# Patient Record
Sex: MALE | Race: WHITE | Employment: UNEMPLOYED | URBAN - METROPOLITAN AREA
[De-identification: names, ages, dates, MRNs, and addresses within clinical notes are randomized per-mention and may not be internally consistent; named-entity substitution may affect disease eponyms.]

---

## 2019-12-07 ENCOUNTER — APPOINTMENT (OUTPATIENT)
Dept: GENERAL RADIOLOGY | Age: 29
DRG: 720 | End: 2019-12-07
Attending: NURSE PRACTITIONER
Payer: MEDICAID

## 2019-12-07 ENCOUNTER — HOSPITAL ENCOUNTER (INPATIENT)
Age: 29
LOS: 5 days | Discharge: HOME | DRG: 720 | End: 2019-12-12
Attending: EMERGENCY MEDICINE | Admitting: INTERNAL MEDICINE
Payer: MEDICAID

## 2019-12-07 ENCOUNTER — APPOINTMENT (OUTPATIENT)
Dept: CT IMAGING | Age: 29
DRG: 720 | End: 2019-12-07
Attending: NURSE PRACTITIONER
Payer: MEDICAID

## 2019-12-07 DIAGNOSIS — E87.20 METABOLIC ACIDOSIS: ICD-10-CM

## 2019-12-07 DIAGNOSIS — E86.0 DEHYDRATION: Primary | ICD-10-CM

## 2019-12-07 DIAGNOSIS — R10.13 ABDOMINAL PAIN, EPIGASTRIC: ICD-10-CM

## 2019-12-07 DIAGNOSIS — E87.20 LACTIC ACIDOSIS: ICD-10-CM

## 2019-12-07 PROBLEM — A41.9 SEPSIS (HCC): Status: ACTIVE | Noted: 2019-12-07

## 2019-12-07 LAB
ALBUMIN SERPL-MCNC: 5.7 G/DL (ref 3.5–5)
ALBUMIN/GLOB SERPL: 1.3 {RATIO} (ref 1.1–2.2)
ALP SERPL-CCNC: 77 U/L (ref 45–117)
ALT SERPL-CCNC: 23 U/L (ref 12–78)
ANION GAP SERPL CALC-SCNC: 22 MMOL/L (ref 5–15)
APPEARANCE UR: ABNORMAL
AST SERPL-CCNC: 22 U/L (ref 15–37)
ATRIAL RATE: 91 BPM
BACTERIA URNS QL MICRO: ABNORMAL /HPF
BASOPHILS # BLD: 0 K/UL (ref 0–0.1)
BASOPHILS NFR BLD: 0 % (ref 0–1)
BILIRUB SERPL-MCNC: 2.3 MG/DL (ref 0.2–1)
BILIRUB UR QL CFM: NEGATIVE
BUN SERPL-MCNC: 89 MG/DL (ref 6–20)
BUN/CREAT SERPL: 19 (ref 12–20)
CALCIUM SERPL-MCNC: 9.8 MG/DL (ref 8.5–10.1)
CALCULATED P AXIS, ECG09: 74 DEGREES
CALCULATED R AXIS, ECG10: 87 DEGREES
CALCULATED T AXIS, ECG11: 67 DEGREES
CHLORIDE SERPL-SCNC: 91 MMOL/L (ref 97–108)
CO2 SERPL-SCNC: 16 MMOL/L (ref 21–32)
COLOR UR: ABNORMAL
COMMENT, HOLDF: NORMAL
COMMENT, HOLDF: NORMAL
CREAT SERPL-MCNC: 4.6 MG/DL (ref 0.7–1.3)
DIAGNOSIS, 93000: NORMAL
DIFFERENTIAL METHOD BLD: ABNORMAL
EOSINOPHIL # BLD: 0 K/UL (ref 0–0.4)
EOSINOPHIL NFR BLD: 0 % (ref 0–7)
EPITH CASTS URNS QL MICRO: ABNORMAL /LPF
ERYTHROCYTE [DISTWIDTH] IN BLOOD BY AUTOMATED COUNT: 12.9 % (ref 11.5–14.5)
FLUAV AG NPH QL IA: NEGATIVE
FLUBV AG NOSE QL IA: NEGATIVE
GLOBULIN SER CALC-MCNC: 4.4 G/DL (ref 2–4)
GLUCOSE SERPL-MCNC: 161 MG/DL (ref 65–100)
GLUCOSE UR STRIP.AUTO-MCNC: NEGATIVE MG/DL
HCT VFR BLD AUTO: 55.4 % (ref 36.6–50.3)
HEMOCCULT STL QL: NEGATIVE
HGB BLD-MCNC: 19.7 G/DL (ref 12.1–17)
HGB UR QL STRIP: ABNORMAL
IMM GRANULOCYTES # BLD AUTO: 0.1 K/UL (ref 0–0.04)
IMM GRANULOCYTES NFR BLD AUTO: 1 % (ref 0–0.5)
KETONES UR QL STRIP.AUTO: NEGATIVE MG/DL
LACTATE SERPL-SCNC: 1.8 MMOL/L (ref 0.4–2)
LACTATE SERPL-SCNC: 4.2 MMOL/L (ref 0.4–2)
LEUKOCYTE ESTERASE UR QL STRIP.AUTO: NEGATIVE
LIPASE SERPL-CCNC: 52 U/L (ref 73–393)
LYMPHOCYTES # BLD: 1.5 K/UL (ref 0.8–3.5)
LYMPHOCYTES NFR BLD: 10 % (ref 12–49)
MCH RBC QN AUTO: 29.4 PG (ref 26–34)
MCHC RBC AUTO-ENTMCNC: 35.6 G/DL (ref 30–36.5)
MCV RBC AUTO: 82.7 FL (ref 80–99)
MONOCYTES # BLD: 0.9 K/UL (ref 0–1)
MONOCYTES NFR BLD: 6 % (ref 5–13)
MUCOUS THREADS URNS QL MICRO: ABNORMAL /LPF
NEUTS SEG # BLD: 12.8 K/UL (ref 1.8–8)
NEUTS SEG NFR BLD: 83 % (ref 32–75)
NITRITE UR QL STRIP.AUTO: NEGATIVE
NRBC # BLD: 0 K/UL (ref 0–0.01)
NRBC BLD-RTO: 0 PER 100 WBC
P-R INTERVAL, ECG05: 118 MS
PH UR STRIP: 5 [PH] (ref 5–8)
PLATELET # BLD AUTO: 332 K/UL (ref 150–400)
PMV BLD AUTO: 11.5 FL (ref 8.9–12.9)
POTASSIUM SERPL-SCNC: 3.5 MMOL/L (ref 3.5–5.1)
PROT SERPL-MCNC: 10.1 G/DL (ref 6.4–8.2)
PROT UR STRIP-MCNC: 100 MG/DL
Q-T INTERVAL, ECG07: 384 MS
QRS DURATION, ECG06: 86 MS
QTC CALCULATION (BEZET), ECG08: 472 MS
RBC # BLD AUTO: 6.7 M/UL (ref 4.1–5.7)
RBC #/AREA URNS HPF: ABNORMAL /HPF (ref 0–5)
SAMPLES BEING HELD,HOLD: NORMAL
SAMPLES BEING HELD,HOLD: NORMAL
SODIUM SERPL-SCNC: 129 MMOL/L (ref 136–145)
SP GR UR REFRACTOMETRY: 1.02 (ref 1–1.03)
UR CULT HOLD, URHOLD: NORMAL
UROBILINOGEN UR QL STRIP.AUTO: 0.2 EU/DL (ref 0.2–1)
VENTRICULAR RATE, ECG03: 91 BPM
WBC # BLD AUTO: 15.3 K/UL (ref 4.1–11.1)
WBC URNS QL MICRO: ABNORMAL /HPF (ref 0–4)

## 2019-12-07 PROCEDURE — C9113 INJ PANTOPRAZOLE SODIUM, VIA: HCPCS | Performed by: NURSE PRACTITIONER

## 2019-12-07 PROCEDURE — 71046 X-RAY EXAM CHEST 2 VIEWS: CPT

## 2019-12-07 PROCEDURE — 93005 ELECTROCARDIOGRAM TRACING: CPT

## 2019-12-07 PROCEDURE — 82272 OCCULT BLD FECES 1-3 TESTS: CPT

## 2019-12-07 PROCEDURE — 74011000250 HC RX REV CODE- 250: Performed by: NURSE PRACTITIONER

## 2019-12-07 PROCEDURE — 74176 CT ABD & PELVIS W/O CONTRAST: CPT

## 2019-12-07 PROCEDURE — 87040 BLOOD CULTURE FOR BACTERIA: CPT

## 2019-12-07 PROCEDURE — 87086 URINE CULTURE/COLONY COUNT: CPT

## 2019-12-07 PROCEDURE — 81001 URINALYSIS AUTO W/SCOPE: CPT

## 2019-12-07 PROCEDURE — 85025 COMPLETE CBC W/AUTO DIFF WBC: CPT

## 2019-12-07 PROCEDURE — 74011000258 HC RX REV CODE- 258: Performed by: INTERNAL MEDICINE

## 2019-12-07 PROCEDURE — 36415 COLL VENOUS BLD VENIPUNCTURE: CPT

## 2019-12-07 PROCEDURE — 96375 TX/PRO/DX INJ NEW DRUG ADDON: CPT

## 2019-12-07 PROCEDURE — 51798 US URINE CAPACITY MEASURE: CPT

## 2019-12-07 PROCEDURE — 99285 EMERGENCY DEPT VISIT HI MDM: CPT

## 2019-12-07 PROCEDURE — 74011250636 HC RX REV CODE- 250/636: Performed by: INTERNAL MEDICINE

## 2019-12-07 PROCEDURE — 87804 INFLUENZA ASSAY W/OPTIC: CPT

## 2019-12-07 PROCEDURE — 74011250637 HC RX REV CODE- 250/637: Performed by: INTERNAL MEDICINE

## 2019-12-07 PROCEDURE — 74011250636 HC RX REV CODE- 250/636: Performed by: NURSE PRACTITIONER

## 2019-12-07 PROCEDURE — 83605 ASSAY OF LACTIC ACID: CPT

## 2019-12-07 PROCEDURE — 74011000250 HC RX REV CODE- 250: Performed by: INTERNAL MEDICINE

## 2019-12-07 PROCEDURE — 80053 COMPREHEN METABOLIC PANEL: CPT

## 2019-12-07 PROCEDURE — 65660000001 HC RM ICU INTERMED STEPDOWN

## 2019-12-07 PROCEDURE — 83690 ASSAY OF LIPASE: CPT

## 2019-12-07 PROCEDURE — 96361 HYDRATE IV INFUSION ADD-ON: CPT

## 2019-12-07 PROCEDURE — 96374 THER/PROPH/DIAG INJ IV PUSH: CPT

## 2019-12-07 RX ORDER — SODIUM CHLORIDE 0.9 % (FLUSH) 0.9 %
5-40 SYRINGE (ML) INJECTION AS NEEDED
Status: DISCONTINUED | OUTPATIENT
Start: 2019-12-07 | End: 2019-12-12 | Stop reason: HOSPADM

## 2019-12-07 RX ORDER — SODIUM CHLORIDE 9 MG/ML
125 INJECTION, SOLUTION INTRAVENOUS CONTINUOUS
Status: DISCONTINUED | OUTPATIENT
Start: 2019-12-07 | End: 2019-12-11

## 2019-12-07 RX ORDER — ONDANSETRON 2 MG/ML
4 INJECTION INTRAMUSCULAR; INTRAVENOUS
Status: DISCONTINUED | OUTPATIENT
Start: 2019-12-07 | End: 2019-12-07

## 2019-12-07 RX ORDER — ONDANSETRON 2 MG/ML
4 INJECTION INTRAMUSCULAR; INTRAVENOUS
Status: DISCONTINUED | OUTPATIENT
Start: 2019-12-07 | End: 2019-12-12 | Stop reason: HOSPADM

## 2019-12-07 RX ORDER — MORPHINE SULFATE 4 MG/ML
4 INJECTION INTRAVENOUS ONCE
Status: COMPLETED | OUTPATIENT
Start: 2019-12-07 | End: 2019-12-07

## 2019-12-07 RX ORDER — MORPHINE SULFATE 2 MG/ML
1 INJECTION, SOLUTION INTRAMUSCULAR; INTRAVENOUS
Status: DISCONTINUED | OUTPATIENT
Start: 2019-12-07 | End: 2019-12-07

## 2019-12-07 RX ORDER — SODIUM CHLORIDE 0.9 % (FLUSH) 0.9 %
5-40 SYRINGE (ML) INJECTION EVERY 8 HOURS
Status: DISCONTINUED | OUTPATIENT
Start: 2019-12-07 | End: 2019-12-12 | Stop reason: HOSPADM

## 2019-12-07 RX ORDER — HALOPERIDOL 5 MG/ML
2 INJECTION INTRAMUSCULAR ONCE
Status: COMPLETED | OUTPATIENT
Start: 2019-12-07 | End: 2019-12-07

## 2019-12-07 RX ORDER — MORPHINE SULFATE 2 MG/ML
1 INJECTION, SOLUTION INTRAMUSCULAR; INTRAVENOUS
Status: DISCONTINUED | OUTPATIENT
Start: 2019-12-07 | End: 2019-12-12 | Stop reason: HOSPADM

## 2019-12-07 RX ORDER — ACETAMINOPHEN 325 MG/1
650 TABLET ORAL
Status: DISCONTINUED | OUTPATIENT
Start: 2019-12-07 | End: 2019-12-12 | Stop reason: HOSPADM

## 2019-12-07 RX ORDER — SODIUM CHLORIDE 0.9 % (FLUSH) 0.9 %
10 SYRINGE (ML) INJECTION
Status: DISCONTINUED | OUTPATIENT
Start: 2019-12-07 | End: 2019-12-07

## 2019-12-07 RX ORDER — HEPARIN SODIUM 5000 [USP'U]/ML
5000 INJECTION, SOLUTION INTRAVENOUS; SUBCUTANEOUS EVERY 12 HOURS
Status: DISCONTINUED | OUTPATIENT
Start: 2019-12-07 | End: 2019-12-12 | Stop reason: HOSPADM

## 2019-12-07 RX ORDER — ONDANSETRON 2 MG/ML
4 INJECTION INTRAMUSCULAR; INTRAVENOUS
Status: COMPLETED | OUTPATIENT
Start: 2019-12-07 | End: 2019-12-07

## 2019-12-07 RX ADMIN — SODIUM CHLORIDE 75 ML/HR: 900 INJECTION, SOLUTION INTRAVENOUS at 13:46

## 2019-12-07 RX ADMIN — SODIUM CHLORIDE 1000 ML: 900 INJECTION, SOLUTION INTRAVENOUS at 10:42

## 2019-12-07 RX ADMIN — CEFEPIME HYDROCHLORIDE 1 G: 1 INJECTION, POWDER, FOR SOLUTION INTRAMUSCULAR; INTRAVENOUS at 13:49

## 2019-12-07 RX ADMIN — Medication 10 ML: at 21:05

## 2019-12-07 RX ADMIN — MORPHINE SULFATE 1 MG: 2 INJECTION, SOLUTION INTRAMUSCULAR; INTRAVENOUS at 23:29

## 2019-12-07 RX ADMIN — SODIUM CHLORIDE 10 MG: 9 INJECTION INTRAMUSCULAR; INTRAVENOUS; SUBCUTANEOUS at 23:22

## 2019-12-07 RX ADMIN — SODIUM CHLORIDE, SODIUM LACTATE, POTASSIUM CHLORIDE, AND CALCIUM CHLORIDE 1000 ML: 600; 310; 30; 20 INJECTION, SOLUTION INTRAVENOUS at 12:18

## 2019-12-07 RX ADMIN — Medication 10 ML: at 14:38

## 2019-12-07 RX ADMIN — ONDANSETRON 4 MG: 2 INJECTION INTRAMUSCULAR; INTRAVENOUS at 21:04

## 2019-12-07 RX ADMIN — SODIUM CHLORIDE 10 MG: 9 INJECTION INTRAMUSCULAR; INTRAVENOUS; SUBCUTANEOUS at 18:25

## 2019-12-07 RX ADMIN — MORPHINE SULFATE 1 MG: 2 INJECTION, SOLUTION INTRAMUSCULAR; INTRAVENOUS at 18:52

## 2019-12-07 RX ADMIN — ONDANSETRON 4 MG: 2 INJECTION INTRAMUSCULAR; INTRAVENOUS at 11:57

## 2019-12-07 RX ADMIN — SODIUM CHLORIDE 1000 ML: 900 INJECTION, SOLUTION INTRAVENOUS at 11:19

## 2019-12-07 RX ADMIN — HEPARIN SODIUM 5000 UNITS: 5000 INJECTION INTRAVENOUS; SUBCUTANEOUS at 14:31

## 2019-12-07 RX ADMIN — SODIUM CHLORIDE 125 ML/HR: 900 INJECTION, SOLUTION INTRAVENOUS at 21:07

## 2019-12-07 RX ADMIN — ONDANSETRON 4 MG: 2 INJECTION INTRAMUSCULAR; INTRAVENOUS at 14:39

## 2019-12-07 RX ADMIN — ACETAMINOPHEN 650 MG: 325 TABLET ORAL at 17:12

## 2019-12-07 RX ADMIN — HALOPERIDOL LACTATE 2 MG: 5 INJECTION INTRAMUSCULAR at 10:42

## 2019-12-07 RX ADMIN — SODIUM CHLORIDE 40 MG: 9 INJECTION INTRAMUSCULAR; INTRAVENOUS; SUBCUTANEOUS at 10:42

## 2019-12-07 RX ADMIN — MORPHINE SULFATE 4 MG: 4 INJECTION INTRAVENOUS at 11:57

## 2019-12-07 RX ADMIN — MORPHINE SULFATE 1 MG: 2 INJECTION, SOLUTION INTRAMUSCULAR; INTRAVENOUS at 14:39

## 2019-12-07 NOTE — ED PROVIDER NOTES
Patient is a 67-year-old male with history of anxiety, depression, septicemia who presents with complaints that started 8 days ago. Patient endorses epigastric and right upper quadrant abdominal pain with nausea and vomiting. Endorses hematemesis and dark stools. Also states his urine is getting darker. Has been unable to keep any fluids down for several days. Endorses dry cough, as of breath no other acute complaints chills. Denies fevers. Endorses body aches. Hot showers sometimes help with his symptoms. He has been trying heat packs on his abdomen. No other interventions. No other acute complaints. Past Medical History:   Diagnosis Date    ADD (attention deficit disorder with hyperactivity)     Anxiety     Depression     Fracture, toe     little left    Septicemia ([facility] [address]        No past surgical history on file.       Family History:   Problem Relation Age of Onset    Psychiatric Disorder Mother         Bipolar    Cancer Father         Lymphoma    Heart Disease Maternal Grandfather     Hypertension Maternal Grandfather     Heart Disease Paternal Grandfather     Hypertension Paternal Grandfather        Social History     Socioeconomic History    Marital status: SINGLE     Spouse name: Not on file    Number of children: Not on file    Years of education: Not on file    Highest education level: Not on file   Occupational History    Not on file   Social Needs    Financial resource strain: Not on file    Food insecurity:     Worry: Not on file     Inability: Not on file    Transportation needs:     Medical: Not on file     Non-medical: Not on file   Tobacco Use    Smoking status: Current Some Day Smoker     Packs/day: 0.25   Substance and Sexual Activity    Alcohol use: No    Drug use: No    Sexual activity: Not on file   Lifestyle    Physical activity:     Days per week: Not on file     Minutes per session: Not on file    Stress: Not on file   Relationships    Social connections:     Talks on phone: Not on file     Gets together: Not on file     Attends [employer] service: Not on file     Active member of club or organization: Not on file     Attends meetings of clubs or organizations: Not on file     Relationship status: Not on file    Intimate partner violence:     Fear of current or ex partner: Not on file     Emotionally abused: Not on file     Physically abused: Not on file     Forced sexual activity: Not on file   Other Topics Concern    Not on file   Social History Narrative    Not on file         ALLERGIES: Patient has no known allergies. Review of Systems   Constitutional: Positive for chills and diaphoresis. Negative for fever. HENT: Positive for congestion, postnasal drip, rhinorrhea and sore throat. Respiratory: Positive for cough (dry cough) and shortness of breath. Gastrointestinal: Positive for abdominal pain, nausea and vomiting. Genitourinary: Positive for decreased urine volume and difficulty urinating. Negative for testicular pain. Musculoskeletal: Positive for myalgias. Neurological: Negative for dizziness and headaches. All other systems reviewed and are negative. There were no vitals filed for this visit. Physical Exam  Vitals signs and nursing note reviewed. Constitutional:       Appearance: Normal appearance. HENT:      Mouth/Throat:      Comments: Mild posterior pharynx erythema with mild swelling. No tonsillar swelling or exudate. Eyes:      Pupils: Pupils are equal, round, and reactive to light. Neck:      Musculoskeletal: Normal range of motion and neck supple. Cardiovascular:      Rate and Rhythm: Regular rhythm. Tachycardia present. Pulmonary:      Effort: Pulmonary effort is normal.      Breath sounds: Normal breath sounds. Abdominal:      General: Abdomen is flat. Palpations: Abdomen is soft. Tenderness: There is tenderness (Epigastric, right upper quadrant). There is guarding. Musculoskeletal: Normal range of motion. Skin:     General: Skin is warm and dry. Neurological:      General: No focal deficit present. Mental Status: He is alert and oriented to person, place, and time. Psychiatric:         Mood and Affect: Mood normal.         Behavior: Behavior normal.          MDM     10:21 AM  Patient presents with multiple complaints but most persistent are epigastric and right upper quadrant abdominal pain with nausea vomiting. On exam he is tachycardic. Afebrile. And pale. Concern for PUD. Versus cholecystitis versus viral.  Will check influenza, abdominal labs, and check for fecal occult blood. Will give IV fluids for dehydration as well as pantoprazole and Haldol for vomiting. 11:36 AM  Lactic acid 4.2. Checking on chemistry that has not resulted. There was an error and has to be rerun. Will continue IVFs and recheck lactate. CT abdomen/pelvis ordered. 11:49 AM  Code sepsis called. Pt continues to have pain in upper abdomen. Chemistry still pending. Will add morphine for pain control. CT scan still pending creatinine results. 12:10 PM  CMP results noted. Pt with metabolic acidosis with an anion gap. Will continue IVFs. CT scan without contrast due to abdominal pain. Will admit to hospitalist.      Hospitalist Coy Serve for Admission  12:09 PM    ED Room Number: ER19/19  Patient Name and age:  Tom Mosqueda 30 y.o.  male  Working Diagnosis: dehydration, JM, lactic acidosis  Readmission:No  Isolation Requirements:  No  Recommended Level of Care:  Step down  Code Status:  {CODE STATUS:Full  Department:ED  Other:      12:52 PM  HR improved. Down to 90s. 3rd liter IVFs ordered. Lactic acid and rpt BMP to check progression.    Procedures

## 2019-12-07 NOTE — PROGRESS NOTES
Admission Medication Reconciliation:    Information obtained from:  Patient; chartreview  RxQuery data available¹:  YES    Comments: Updated PTA meds/reviewed patient's allergies. 1)  Spoke with patient who is a good historian. He is currently not taking any medications; patient DOES have old RX for Clonopin that he may take during anxiety attack. Denies any recent intake of PeptoBismol, multivitamins, iron supplement; Patient alcohol and THC intake is limited (~twice month). 2)  Medication changes (since last review)  Removed  - Zantac    3) updated Allergies (added Buspar)  Patient reported having \"anaphylactic\" shock  eight years ago; shortly after starting [facility] benefit data reflects medications filled and processed through the patient's insurance, however   this data does NOT capture whether the medication was picked up or is currently being taken by the patient. Allergies:  Buspar [buspirone]    Significant PMH/Disease States:   Past Medical History:   Diagnosis Date    ADD (attention deficit disorder with hyperactivity)     Anxiety     Depression     Fracture, toe     little left    Septicemia [facility])      Chief Complaint for this Admission:    Chief Complaint   Patient presents with    Urinary Hesitancy    Vomiting    Chills     Prior to Admission Medications:   None       Please contact the main inpatient pharmacy with any questions or concerns at [phone] and we will direct you to the clinical pharmacist covering this patient's care while in-house.    Munira Alves, KESHAV

## 2019-12-07 NOTE — H&P
History & Physical    Primary Care Provider: Eric Casey MD  Source of Information: Patient     History of Presenting Illness:   Mr Herbie Nielson is 70-year-old male with no significant medical history presents to the hospital with chief complaint of not feeling well of 1 week duration. Patient stated that for the last 1 week he was not feeling well in which he described as having both right and left lower lower abdominal pain, flan pain and pain on his epigastric area associated with nausea and recurrent vomiting. Patient also stated that he was very diaphoretic having pain during urination with dark color urine. He was having additional urinary symptoms with needing to strain before urination and poor intermittent flow. He was also having some difficulty breathing and wheezing . He denies fever, chest pain, palpitation or cough. Patient was last admitted in the hospital in 2010 for sepsis due to community-acquired pneumonia. Patient stated that he has multiple admission for septicemia even though record shows that he has been admitted once in this hospital.       Review of Systems:  Review of Systems   Constitutional: Negative for activity change, Per HPI   HENT: Negative for ear pain, facial swelling, sore throat and trouble swallowing.    Eyes: No visual disturbance. Negative for pain and discharge. Respiratory: Per HPI   Cardiovascular: Negative for chest pain and palpitations. Gastrointestinal: Per HPI   Genitourinary: Per HPI   Musculoskeletal: Negative for arthralgias, joint swelling, myalgias and neck pain. Skin: Negative for color change and rash. Neurological: Negative for  dizziness, headache, weakness or numbness. Hematological: Negative for adenopathy. Does not bruise/bleed easily. Psychiatric/Behavioral: Negative for behavioral problems, confusion and sleep disturbance.   All other systems reviewed and are negative.         Past Medical History:   Diagnosis Date    ADD (attention deficit disorder with hyperactivity)     Anxiety     Depression     Fracture, toe     little left    Septicemia ([facility] [address]       History reviewed. No pertinent surgical history. Prior to Admission medications    Not on File     Allergies   Allergen Reactions    Buspar [Buspirone] Unknown (comments)     Patient reported having \"anaphylactic\" shock in ~2012      Family History   Problem Relation Age of Onset    Psychiatric Disorder Mother         Bipolar    Cancer Father         Lymphoma    Heart Disease Maternal Grandfather     Hypertension Maternal Grandfather     Heart Disease Paternal Grandfather     Hypertension Paternal Grandfather         SOCIAL HISTORY:  Patient resides:  Independently x   Assisted Living    SNF    With family care       Smoking history:   None    Former    Chronic x     Alcohol history:   None    Social x   Chronic      Ambulates:   Independently x   w/cane    w/walker    w/wc    CODE STATUS:  DNR    Full x   Other      Objective:     Physical Exam:     Visit Vitals  /89   Pulse 91   Temp 98.8 °F (37.1 °C)   Resp 15   SpO2 97%      O2 Device: Room air    General:  Alert, cooperative, no distress, appears stated age. Head:  Normocephalic, without obvious abnormality, atraumatic. Eyes:  Conjunctivae/corneas clear. PERRL, EOMs intact. Nose: Nares normal. Septum midline. Mucosa normal. No drainage or sinus tenderness. Throat: Lips, mucosa, and tongue normal. Teeth and gums normal.   Neck: Supple, symmetrical, trachea midline, no adenopathy, thyroid: no enlargement/tenderness/nodules, no carotid bruit and no JVD. Back:   Symmetric, no curvature. ROM normal. No CVA tenderness. Lungs:   Clear to auscultation bilaterally. Chest wall:  No tenderness or deformity. Heart:  Regular rate and rhythm, S1, S2 normal, no murmur, click, rub or gallop.    Abdomen:   Abdominal tenderness at the epigastric area, RUQ, R and LLQ  : CVA tenderness    Extremities: Extremities normal, atraumatic, no cyanosis or edema. Pulses: 2+ and symmetric all extremities. Skin: Skin color, texture, turgor normal. No rashes or lesions   Neurologic: CNII-XII intact. Data Review:     Recent Days:  Recent Labs     12/07/19  1029   WBC 15.3*   HGB 19.7*   HCT 55.4*        Recent Labs     12/07/19  1029   *   K 3.5   CL 91*   CO2 16*   *   BUN 89*   CREA 4.60*   CA 9.8   ALB 5.7*   TBILI 2.3*   SGOT 22   ALT 23     No results for input(s): PH, PCO2, PO2, HCO3, FIO2 in the last 72 hours. 24 Hour Results:  Recent Results (from the past 24 hour(s))   CBC WITH AUTOMATED DIFF    Collection Time: 12/07/19 10:29 AM   Result Value Ref Range    WBC 15.3 (H) 4.1 - 11.1 K/uL    RBC 6.70 (H) 4.10 - 5.70 M/uL    HGB 19.7 (H) 12.1 - 17.0 g/dL    HCT 55.4 (H) 36.6 - 50.3 %    MCV 82.7 80.0 - 99.0 FL    MCH 29.4 26.0 - 34.0 PG    MCHC 35.6 30.0 - 36.5 g/dL    RDW 12.9 11.5 - 14.5 %    PLATELET 767 875 - 256 K/uL    MPV 11.5 8.9 - 12.9 FL    NRBC 0.0 0  WBC    ABSOLUTE NRBC 0.00 0.00 - 0.01 K/uL    NEUTROPHILS 83 (H) 32 - 75 %    LYMPHOCYTES 10 (L) 12 - 49 %    MONOCYTES 6 5 - 13 %    EOSINOPHILS 0 0 - 7 %    BASOPHILS 0 0 - 1 %    IMMATURE GRANULOCYTES 1 (H) 0.0 - 0.5 %    ABS. NEUTROPHILS 12.8 (H) 1.8 - 8.0 K/UL    ABS. LYMPHOCYTES 1.5 0.8 - 3.5 K/UL    ABS. MONOCYTES 0.9 0.0 - 1.0 K/UL    ABS. EOSINOPHILS 0.0 0.0 - 0.4 K/UL    ABS. BASOPHILS 0.0 0.0 - 0.1 K/UL    ABS. IMM.  GRANS. 0.1 (H) 0.00 - 0.04 K/UL    DF AUTOMATED     METABOLIC PANEL, COMPREHENSIVE    Collection Time: 12/07/19 10:29 AM   Result Value Ref Range    Sodium 129 (L) 136 - 145 mmol/L    Potassium 3.5 3.5 - 5.1 mmol/L    Chloride 91 (L) 97 - 108 mmol/L    CO2 16 (L) 21 - 32 mmol/L    Anion gap 22 (H) 5 - 15 mmol/L    Glucose 161 (H) 65 - 100 mg/dL    BUN 89 (H) 6 - 20 MG/DL    Creatinine 4.60 (H) 0.70 - 1.30 MG/DL    BUN/Creatinine ratio 19 12 - 20      GFR est AA 19 (L) >60 ml/min/1.73m2    GFR est non-AA 15 (L) >60 ml/min/1.73m2    Calcium 9.8 8.5 - 10.1 MG/DL    Bilirubin, total 2.3 (H) 0.2 - 1.0 MG/DL    ALT (SGPT) 23 12 - 78 U/L    AST (SGOT) 22 15 - 37 U/L    Alk. phosphatase 77 45 - 117 U/L    Protein, total 10.1 (H) 6.4 - 8.2 g/dL    Albumin 5.7 (H) 3.5 - 5.0 g/dL    Globulin 4.4 (H) 2.0 - 4.0 g/dL    A-G Ratio 1.3 1.1 - 2.2     LIPASE    Collection Time: 12/07/19 10:29 AM   Result Value Ref Range    Lipase 52 (L) 73 - 393 U/L   INFLUENZA A & B AG (RAPID TEST)    Collection Time: 12/07/19 10:29 AM   Result Value Ref Range    Influenza A Antigen NEGATIVE  NEG      Influenza B Antigen NEGATIVE  NEG     LACTIC ACID    Collection Time: 12/07/19 10:29 AM   Result Value Ref Range    Lactic acid 4.2 (HH) 0.4 - 2.0 MMOL/L   SAMPLES BEING HELD    Collection Time: 12/07/19 10:29 AM   Result Value Ref Range    SAMPLES BEING HELD 1RED 1BLU     COMMENT        Add-on orders for these samples will be processed based on acceptable specimen integrity and analyte stability, which may vary by analyte. OCCULT BLOOD, STOOL    Collection Time: 12/07/19 10:56 AM   Result Value Ref Range    Occult blood, stool NEGATIVE  NEG     URINALYSIS W/ RFLX MICROSCOPIC    Collection Time: 12/07/19 11:21 AM   Result Value Ref Range    Color DARK YELLOW      Appearance CLOUDY (A) CLEAR      Specific gravity 1.021 1.003 - 1.030      pH (UA) 5.0 5.0 - 8.0      Protein 100 (A) NEG mg/dL    Glucose NEGATIVE  NEG mg/dL    Ketone NEGATIVE  NEG mg/dL    Blood SMALL (A) NEG      Urobilinogen 0.2 0.2 - 1.0 EU/dL    Nitrites NEGATIVE  NEG      Leukocyte Esterase NEGATIVE  NEG      WBC 5-10 0 - 4 /hpf    RBC 5-10 0 - 5 /hpf    Epithelial cells FEW FEW /lpf    Bacteria 1+ (A) NEG /hpf    Mucus 1+ (A) NEG /lpf   URINE CULTURE HOLD SAMPLE    Collection Time: 12/07/19 11:21 AM   Result Value Ref Range    Urine culture hold        URINE ON HOLD IN MICROBIOLOGY DEPT FOR 3 DAYS.  IF UNPRESERVED URINE IS SUBMITTED, IT CANNOT BE USED FOR ADDITIONAL TESTING AFTER 24 HRS, RECOLLECTION WILL BE REQUIRED. BILIRUBIN, CONFIRM    Collection Time: 12/07/19 11:21 AM   Result Value Ref Range    Bilirubin UA, confirm NEGATIVE  NEG     EKG, 12 LEAD, INITIAL    Collection Time: 12/07/19 12:29 PM   Result Value Ref Range    Ventricular Rate 91 BPM    Atrial Rate 91 BPM    P-R Interval 118 ms    QRS Duration 86 ms    Q-T Interval 384 ms    QTC Calculation (Bezet) 472 ms    Calculated P Axis 74 degrees    Calculated R Axis 87 degrees    Calculated T Axis 67 degrees    Diagnosis       Normal sinus rhythm with sinus arrhythmia  When compared with ECG of 29-JUN-2012 03:47,  No significant change was found     SAMPLES BEING HELD    Collection Time: 12/07/19  1:32 PM   Result Value Ref Range    SAMPLES BEING HELD LACIONICE     COMMENT        Add-on orders for these samples will be processed based on acceptable specimen integrity and analyte stability, which may vary by analyte. Imaging:     Assessment:     # Sepsis due to pyelonephritis  # Anion gap metabolic acidosis  # JM  # Hyponatremia       Plan:     # Sepsis due to pyelonephritis  Supported by tachycardia, high white cell count and elevated lactic acid, +UA associated nausea and vomiting   - UA suggestive for infection, urine culture pending- follow result   - Blood culture collected in ER, follow result   - CT abdomen no hydronephrosis   - Start on cefepime  - supportive measures: start on IVF, Zofran and pain medication   - trend troponin     # Anion gap metabolic acidosis  likely from lactic acidosis   - ct with IVF     # JM  - start on IVF   - monitor BMP daily   - avoid nephrotoxins and renally dose others     # Sob/wheeze: chest x ray unremarkable. Nebs PRN   # RUQ/Epigastric pain: trial of Pepcid.  If no symptom improvement will consider gallbladder US     # Hyponatremia: ct with IVF     Code: Full   DVT prop: Heparin     Dispo: Home   Functional status: Fully independent    Sepsis assessment done and completed.          Signed By: Berenice Smith MD     December 7, 2019

## 2019-12-07 NOTE — ED NOTES
ED MD EKG interpretation: Normal sinus rhythm at 91 beats a minute. Axis is normal, intervals are normal, no ectopy or acute ischemic changes noted.

## 2019-12-07 NOTE — ED TRIAGE NOTES
Arrives ambulatory for c/o dark urine, difficulty voiding, flank pain, vomiting \"dark stuff\", shortness of breath, difficulty sleeping, and chills. \"Cold weather makes the pain worse. \"  Pt has reddened area to abdomen from taking multiple hot showers     Hx sepsis

## 2019-12-07 NOTE — ROUTINE PROCESS
TRANSFER - OUT REPORT: 
 
Verbal report given to MARCELL Lassiter(name) on Abhijeet Berry  being transferred to [facility](unit) for routine progression of care Report consisted of patients Situation, Background, Assessment and  
Recommendations(SBAR). Information from the following report(s) SBAR was reviewed with the receiving nurse. Lines:  
Peripheral IV 12/07/19 Left Forearm (Active) Site Assessment Clean 12/7/2019 10:35 AM  
Phlebitis Assessment 0 12/7/2019 10:35 AM  
Infiltration Assessment 0 12/7/2019 10:35 AM  
Dressing Status Clean, dry, & intact 12/7/2019 10:35 AM  
Dressing Type Transparent 12/7/2019 10:35 AM  
Hub Color/Line Status Pink 12/7/2019 10:35 AM  
Action Taken Blood drawn 12/7/2019 10:35 AM  
   
Peripheral IV 12/07/19 Right Antecubital (Active) Opportunity for questions and clarification was provided. Patient transported with: 
 [place]

## 2019-12-07 NOTE — ED NOTES
Attempted to call report to Lee brody RN. Unable to take report at this time. Will call after hanging medication.

## 2019-12-07 NOTE — PROGRESS NOTES
Problem: Sepsis: Day of Diagnosis  Goal: *Fluid resuscitation  Outcome: Progressing Towards Goal  2L/ Normal saline, 1L/ Lactated Ringers  Goal: *Paired blood cultures prior to first dose of antibiotic  Outcome: Progressing Towards Goal    Blood cultures pending   Goal: *Lactic acid with first set of blood cultures  Outcome: Progressing Towards Goal  Lactic acid 1.8 now  Goal: Diagnostic Test/Procedures  Outcome: Progressing Towards Goal  Chest x ray, abdominal ultrasound   Goal: Medications  Outcome: Progressing Towards Goal  Zosyn, vancomycin   Goal: Treatments/Interventions/Procedures  Outcome: Progressing Towards Goal  Strict intake and output, vital signs q4h, daily labs     2003-Bedside shift change report given to [address] [place] [address]  (oncoming nurse) by Yuly Israel RN  (offgoing nurse). Report included the following information SBAR, Intake/Output, MAR, Accordion and Recent Results.

## 2019-12-07 NOTE — PROGRESS NOTES
Day #1 of cefepime  Indication:  UTI  Current regimen:  1 gram Q12h  Abx regimen: cefepime  Recent Labs     19  1029   WBC 15.3*   CREA 4.60*   BUN 89*     Est CrCl: 20-30 ml/min  Temp (24hrs), Av.3 °F (36.8 °C), Min:97.7 °F (36.5 °C), Max:98.8 °F (37.1 °C)    Cultures:    pending    Plan: Change to cefepime 1 gram Q24H for severe UTI and crcl 20-30 ml/min

## 2019-12-08 LAB
ANION GAP SERPL CALC-SCNC: 4 MMOL/L (ref 5–15)
BACTERIA SPEC CULT: NORMAL
BUN SERPL-MCNC: 49 MG/DL (ref 6–20)
BUN/CREAT SERPL: 29 (ref 12–20)
CALCIUM SERPL-MCNC: 7.6 MG/DL (ref 8.5–10.1)
CC UR VC: NORMAL
CHLORIDE SERPL-SCNC: 107 MMOL/L (ref 97–108)
CO2 SERPL-SCNC: 26 MMOL/L (ref 21–32)
CREAT SERPL-MCNC: 1.69 MG/DL (ref 0.7–1.3)
ERYTHROCYTE [DISTWIDTH] IN BLOOD BY AUTOMATED COUNT: 12.3 % (ref 11.5–14.5)
GLUCOSE SERPL-MCNC: 117 MG/DL (ref 65–100)
HCT VFR BLD AUTO: 36.6 % (ref 36.6–50.3)
HGB BLD-MCNC: 12.5 G/DL (ref 12.1–17)
MAGNESIUM SERPL-MCNC: 2.7 MG/DL (ref 1.6–2.4)
MCH RBC QN AUTO: 29.5 PG (ref 26–34)
MCHC RBC AUTO-ENTMCNC: 34.2 G/DL (ref 30–36.5)
MCV RBC AUTO: 86.3 FL (ref 80–99)
NRBC # BLD: 0 K/UL (ref 0–0.01)
NRBC BLD-RTO: 0 PER 100 WBC
PLATELET # BLD AUTO: 199 K/UL (ref 150–400)
PMV BLD AUTO: 11.3 FL (ref 8.9–12.9)
POTASSIUM SERPL-SCNC: 3.4 MMOL/L (ref 3.5–5.1)
RBC # BLD AUTO: 4.24 M/UL (ref 4.1–5.7)
SERVICE CMNT-IMP: NORMAL
SODIUM SERPL-SCNC: 137 MMOL/L (ref 136–145)
WBC # BLD AUTO: 10.4 K/UL (ref 4.1–11.1)

## 2019-12-08 PROCEDURE — 83735 ASSAY OF MAGNESIUM: CPT

## 2019-12-08 PROCEDURE — 85027 COMPLETE CBC AUTOMATED: CPT

## 2019-12-08 PROCEDURE — 74011250637 HC RX REV CODE- 250/637: Performed by: INTERNAL MEDICINE

## 2019-12-08 PROCEDURE — 65270000029 HC RM PRIVATE

## 2019-12-08 PROCEDURE — 36415 COLL VENOUS BLD VENIPUNCTURE: CPT

## 2019-12-08 PROCEDURE — 74011000250 HC RX REV CODE- 250: Performed by: INTERNAL MEDICINE

## 2019-12-08 PROCEDURE — 74011250636 HC RX REV CODE- 250/636: Performed by: INTERNAL MEDICINE

## 2019-12-08 PROCEDURE — 80048 BASIC METABOLIC PNL TOTAL CA: CPT

## 2019-12-08 PROCEDURE — 74011000258 HC RX REV CODE- 258: Performed by: INTERNAL MEDICINE

## 2019-12-08 RX ORDER — IBUPROFEN 200 MG
1 TABLET ORAL DAILY
Status: DISCONTINUED | OUTPATIENT
Start: 2019-12-08 | End: 2019-12-12 | Stop reason: HOSPADM

## 2019-12-08 RX ORDER — POTASSIUM CHLORIDE 750 MG/1
40 TABLET, FILM COATED, EXTENDED RELEASE ORAL
Status: COMPLETED | OUTPATIENT
Start: 2019-12-08 | End: 2019-12-08

## 2019-12-08 RX ORDER — FAMOTIDINE 20 MG/1
20 TABLET, FILM COATED ORAL 2 TIMES DAILY
Status: DISCONTINUED | OUTPATIENT
Start: 2019-12-08 | End: 2019-12-12 | Stop reason: HOSPADM

## 2019-12-08 RX ADMIN — Medication 10 ML: at 14:00

## 2019-12-08 RX ADMIN — SODIUM CHLORIDE 125 ML/HR: 900 INJECTION, SOLUTION INTRAVENOUS at 05:25

## 2019-12-08 RX ADMIN — MORPHINE SULFATE 1 MG: 2 INJECTION, SOLUTION INTRAMUSCULAR; INTRAVENOUS at 03:10

## 2019-12-08 RX ADMIN — MORPHINE SULFATE 1 MG: 2 INJECTION, SOLUTION INTRAMUSCULAR; INTRAVENOUS at 11:37

## 2019-12-08 RX ADMIN — SODIUM CHLORIDE 10 MG: 9 INJECTION INTRAMUSCULAR; INTRAVENOUS; SUBCUTANEOUS at 19:37

## 2019-12-08 RX ADMIN — SODIUM CHLORIDE 10 MG: 9 INJECTION INTRAMUSCULAR; INTRAVENOUS; SUBCUTANEOUS at 05:38

## 2019-12-08 RX ADMIN — MORPHINE SULFATE 1 MG: 2 INJECTION, SOLUTION INTRAMUSCULAR; INTRAVENOUS at 20:37

## 2019-12-08 RX ADMIN — SODIUM CHLORIDE 125 ML/HR: 900 INJECTION, SOLUTION INTRAVENOUS at 13:12

## 2019-12-08 RX ADMIN — CEFEPIME HYDROCHLORIDE 1 G: 1 INJECTION, POWDER, FOR SOLUTION INTRAMUSCULAR; INTRAVENOUS at 14:08

## 2019-12-08 RX ADMIN — HEPARIN SODIUM 5000 UNITS: 5000 INJECTION INTRAVENOUS; SUBCUTANEOUS at 14:09

## 2019-12-08 RX ADMIN — MORPHINE SULFATE 1 MG: 2 INJECTION, SOLUTION INTRAMUSCULAR; INTRAVENOUS at 16:32

## 2019-12-08 RX ADMIN — FAMOTIDINE 20 MG: 20 TABLET ORAL at 20:37

## 2019-12-08 RX ADMIN — ONDANSETRON 4 MG: 2 INJECTION INTRAMUSCULAR; INTRAVENOUS at 09:39

## 2019-12-08 RX ADMIN — SODIUM CHLORIDE 10 MG: 9 INJECTION INTRAMUSCULAR; INTRAVENOUS; SUBCUTANEOUS at 11:36

## 2019-12-08 RX ADMIN — ONDANSETRON 4 MG: 2 INJECTION INTRAMUSCULAR; INTRAVENOUS at 03:10

## 2019-12-08 RX ADMIN — MORPHINE SULFATE 1 MG: 2 INJECTION, SOLUTION INTRAMUSCULAR; INTRAVENOUS at 07:32

## 2019-12-08 RX ADMIN — Medication 10 ML: at 05:39

## 2019-12-08 RX ADMIN — ONDANSETRON 4 MG: 2 INJECTION INTRAMUSCULAR; INTRAVENOUS at 16:32

## 2019-12-08 RX ADMIN — Medication 10 ML: at 05:38

## 2019-12-08 RX ADMIN — POTASSIUM CHLORIDE 40 MEQ: 750 TABLET, FILM COATED, EXTENDED RELEASE ORAL at 09:39

## 2019-12-08 RX ADMIN — ACETAMINOPHEN 650 MG: 325 TABLET ORAL at 19:38

## 2019-12-08 NOTE — PROGRESS NOTES
Problem: Sepsis: Day of Diagnosis  Goal: *Fluid resuscitation  Outcome: Progressing Towards Goal  Note:   Patient on IV fluids,iv abx,pain and nausea control for pyelonephritis,monitoring labs and vitals. pt progressing towards goal.  Goal: *Paired blood cultures prior to first dose of antibiotic  Outcome: Progressing Towards Goal  Goal: Activity/Safety  Outcome: Progressing Towards Goal  Goal: Nutrition/Diet  Outcome: Progressing Towards Goal     Problem: Falls - Risk of  Goal: *Absence of Falls  Description  Document Mela Cruz Fall Risk and appropriate interventions in the flowsheet.   Outcome: Progressing Towards Goal  Note: Fall Risk Interventions:            Medication Interventions: Evaluate medications/consider consulting pharmacy, Patient to call before getting OOB, Teach patient to arise slowly    Elimination Interventions: Call light in reach, Patient to call for help with toileting needs, Stay With Me (per policy), Urinal in reach, Toileting schedule/hourly rounds, Toilet paper/wipes in reach

## 2019-12-08 NOTE — PROGRESS NOTES
Hospitalist Progress Note  Ghada Beltre MD  Answering service: [phone] from in house phone      Date of Service:  2019  NAME:  Jason Alston  :  1990  MRN:  [id number]      Admission Summary:   Mr Savi Buchanan is 67-year-old male with no significant medical history presents to the hospital with chief complaint of not feeling well of 1 week duration. Patient stated that for the last 1 week he was not feeling well in which he described as having both right and left lower lower abdominal pain, flan pain and pain on his epigastric area associated with nausea and recurrent vomiting. Patient also stated that he was very diaphoretic having pain during urination with dark color urine. He was having additional urinary symptoms with needing to strain before urination and poor intermittent flow. He was also having some difficulty breathing and wheezing . He denies fever, chest pain, palpitation or cough. Patient was last admitted in the hospital in  for sepsis due to community-acquired pneumonia. Patient stated that he has multiple admission for septicemia even though record shows that he has been admitted once in this hospital.    Interval history / Subjective:      Patient seen and examined this afternoon. He is still c/o right upper quadrant abdominal pain. Nausea, vomiting, better. Labs better     Assessment & Plan:     # Sepsis no clear source   due to suspected pyelonephritis vs cholecystitis   Supported by tachycardia, high white cell count and elevated lactic acid, +UA,  associated nausea and vomiting   - UA with bacteria, urine culture no growth   - blood culture gram +ve cocci 1/4 bottle and no growth in others.   - CT abdomen no hydronephrosis, no appendicitis   - RUQ abdominal US  Small amount of gallbladder sludge, with wall thickening up to 4.3 mm and mild pericholecystic fluid.  9 mm gallbladder polyp or adherent stone  - ct with cefepime 12/7--present   - supportive measures: on IVF, Zofran and pain medication   - lactic acidosis resolved   - surgery consult- plan for HIDA scan      # Anion gap metabolic acidosis: resolved   likely from lactic acidosis   - s/p IVF      # JM: Resolved   - ct with IVF   - monitor BMP daily   - avoid nephrotoxins and renally dose others      # Sob/wheeze: chest x ray unremarkable. Nebs PRN   # RUQ/Epigastric pain: trial of Pepcid.  US as above. Plan as above   # Hyponatremia: resolved after IVF      Code: Full   DVT prop: Heparin      Dispo: Home      Hospital Problems  Date Reviewed: 1/31/2014          Codes Class Noted POA    Sepsis ([facility] [address] ICD-10-CM: A41.9  ICD-9-CM: 038.9, 995.91  12/7/2019 Unknown                Review of Systems:   Pertinent positive mentioned in interval hx/HPI. Other systems reviewed and negative     Vital Signs:    Last 24hrs VS reviewed since prior progress note. Most recent are:  Visit Vitals  /74 (BP 1 Location: Left arm, BP Patient Position: At rest)   Pulse 72   Temp 98.3 °F (36.8 °C)   Resp 12   Wt 59 kg (130 lb)   SpO2 98%   BMI 19.77 kg/m²         Intake/Output Summary (Last 24 hours) at 12/8/2019 1715  Last data filed at 12/8/2019 1409  Gross per 24 hour   Intake 2667.5 ml   Output 1545 ml   Net 1122.5 ml        Physical Examination:             Constitutional:  No acute distress, cooperative, pleasant    ENT:  Oral mucous moist, oropharynx benign. Neck supple,    Resp:  CTA bilaterally. No wheezing/rhonchi/rales. No accessory muscle use   CV:  Regular rhythm, normal rate, no murmurs, gallops, rubs    GI:  Soft, non distended, mild tenderness on RUQ. normoactive bowel sounds, no hepatosplenomegaly     Musculoskeletal:  No edema, warm, 2+ pulses throughout    Neurologic:  Moves all extremities.   AAOx3, CN II-XII reviewed           Data Review:   I personally reviewed labs and imaging     Labs:     Recent Labs     12/08/19  0316 12/07/19  1029 WBC 10.4 15.3*   HGB 12.5 19.7*   HCT 36.6 55.4*    332     Recent Labs     12/08/19  0345 12/08/19  0316 12/07/19  1029   NA  --  137 129*   K  --  3.4* 3.5   CL  --  107 91*   CO2  --  26 16*   BUN  --  49* 89*   CREA  --  1.69* 4.60*   GLU  --  117* 161*   CA  --  7.6* 9.8   MG 2.7*  --   --      Recent Labs     12/07/19  1029   SGOT 22   ALT 23   AP 77   TBILI 2.3*   TP 10.1*   ALB 5.7*   GLOB 4.4*   LPSE 52*     No results for input(s): INR, PTP, APTT, INREXT in the last 72 hours. No results for input(s): FE, TIBC, PSAT, FERR in the last 72 hours. No results found for: FOL, RBCF   No results for input(s): PH, PCO2, PO2 in the last 72 hours. No results for input(s): CPK, CKNDX, TROIQ in the last 72 hours.     No lab exists for component: CPKMB  No results found for: CHOL, CHOLX, CHLST, CHOLV, HDL, HDLP, LDL, LDLC, DLDLP, TGLX, TRIGL, TRIGP, CHHD, CHHDX  Lab Results   Component Value Date/Time    Glucose (POC) 98 10/13/2010 08:00 AM    Glucose (POC) 105 10/12/2010 10:05 PM    Glucose (POC) 151 (H) 10/12/2010 11:51 AM    Glucose (POC) 195 (H) 10/12/2010 07:24 AM     Lab Results   Component Value Date/Time    Color DARK YELLOW 12/07/2019 11:21 AM    Appearance CLOUDY (A) 12/07/2019 11:21 AM    Specific gravity 1.021 12/07/2019 11:21 AM    pH (UA) 5.0 12/07/2019 11:21 AM    Protein 100 (A) 12/07/2019 11:21 AM    Glucose NEGATIVE  12/07/2019 11:21 AM    Ketone NEGATIVE  12/07/2019 11:21 AM    Bilirubin Negative 01/31/2014 11:15 AM    Urobilinogen 0.2 12/07/2019 11:21 AM    Nitrites NEGATIVE  12/07/2019 11:21 AM    Leukocyte Esterase NEGATIVE  12/07/2019 11:21 AM    Epithelial cells FEW 12/07/2019 11:21 AM    Bacteria 1+ (A) 12/07/2019 11:21 AM    WBC 5-10 12/07/2019 11:21 AM    RBC 5-10 12/07/2019 11:21 AM         Medications Reviewed:     Current Facility-Administered Medications   Medication Dose Route Frequency    nicotine (NICODERM CQ) 14 mg/24 hr patch 1 Patch  1 Patch TransDERmal DAILY    sodium chloride (NS) flush 5-40 mL  5-40 mL IntraVENous Q8H    sodium chloride (NS) flush 5-40 mL  5-40 mL IntraVENous PRN    acetaminophen (TYLENOL) tablet 650 mg  650 mg Oral Q4H PRN    cefepime (MAXIPIME) 1 g in 0.9% sodium chloride (MBP/ADV) 50 mL  1 g IntraVENous Q24H    0.9% sodium chloride infusion  125 mL/hr IntraVENous CONTINUOUS    sodium chloride (NS) flush 5-40 mL  5-40 mL IntraVENous Q8H    sodium chloride (NS) flush 5-40 mL  5-40 mL IntraVENous PRN    heparin (porcine) injection 5,000 Units  5,000 Units SubCUTAneous Q12H    ondansetron (ZOFRAN) injection 4 mg  4 mg IntraVENous Q4H PRN    influenza vaccine 2019-20 (6 mos+)(PF) (FLUARIX/FLULAVAL/FLUZONE QUAD) injection 0.5 mL  0.5 mL IntraMUSCular PRIOR TO DISCHARGE    prochlorperazine (COMPAZINE) with saline injection 10 mg  10 mg IntraVENous Q6H PRN    morphine injection 1 mg  1 mg IntraVENous Q4H PRN     ______________________________________________________________________  EXPECTED LENGTH OF STAY: - - -  ACTUAL LENGTH OF STAY:          1                 Janki Robison MD   Patient has given Verbal permission to discuss medical care with   persons present in the room and and also with contact as listed on face sheet.

## 2019-12-08 NOTE — PROGRESS NOTES
Problem: Sepsis: Day of Diagnosis  Goal: *Fluid resuscitation  Outcome: Progressing Towards Goal  Goal: Medications  Outcome: Progressing Towards Goal  Goal: Respiratory  Outcome: Progressing Towards Goal     Problem: Falls - Risk of  Goal: *Absence of Falls  Description  Document Willard Laverne Fall Risk and appropriate interventions in the flowsheet. Outcome: Progressing Towards Goal  Note: Fall Risk Interventions:  Medication Interventions: Patient to call before getting OOB, Teach patient to arise slowly    Elimination Interventions: Call light in reach, Patient to call for help with toileting needs, Stay With Me (per policy)     Last 3 Recorded Weights in this Encounter    12/07/19 1730 12/08/19 0314   Weight: 58.3 kg (128 lb 8 oz) 59 kg (130 lb)   Pt. Weight bed. Bedside shift change report given to Ulysses Quiroz RN (oncoming nurse) by Howie Drummond RN (offgoing nurse). Report included the following information SBAR, Kardex, ED Summary, Procedure Summary, Intake/Output, MAR, Accordion, Recent Results, Med Rec Status, Cardiac Rhythm NSR, Sinus Tachycardia, Alarm Parameters , Pre Procedure Checklist, Procedure Verification and Quality Measures.

## 2019-12-09 ENCOUNTER — APPOINTMENT (OUTPATIENT)
Dept: ULTRASOUND IMAGING | Age: 29
DRG: 720 | End: 2019-12-09
Attending: INTERNAL MEDICINE
Payer: MEDICAID

## 2019-12-09 LAB
ALBUMIN SERPL-MCNC: 3.3 G/DL (ref 3.5–5)
ALBUMIN/GLOB SERPL: 1.7 {RATIO} (ref 1.1–2.2)
ALP SERPL-CCNC: 46 U/L (ref 45–117)
ALT SERPL-CCNC: 17 U/L (ref 12–78)
ANION GAP SERPL CALC-SCNC: 4 MMOL/L (ref 5–15)
AST SERPL-CCNC: 21 U/L (ref 15–37)
BILIRUB DIRECT SERPL-MCNC: 0.4 MG/DL (ref 0–0.2)
BILIRUB SERPL-MCNC: 2.5 MG/DL (ref 0.2–1)
BUN SERPL-MCNC: 26 MG/DL (ref 6–20)
BUN/CREAT SERPL: 26 (ref 12–20)
CALCIUM SERPL-MCNC: 7.8 MG/DL (ref 8.5–10.1)
CHLORIDE SERPL-SCNC: 108 MMOL/L (ref 97–108)
CO2 SERPL-SCNC: 26 MMOL/L (ref 21–32)
CREAT SERPL-MCNC: 0.99 MG/DL (ref 0.7–1.3)
ERYTHROCYTE [DISTWIDTH] IN BLOOD BY AUTOMATED COUNT: 12.2 % (ref 11.5–14.5)
GLOBULIN SER CALC-MCNC: 1.9 G/DL (ref 2–4)
GLUCOSE SERPL-MCNC: 99 MG/DL (ref 65–100)
HAV IGM SER QL: NONREACTIVE
HBV CORE IGM SER QL: NONREACTIVE
HBV SURFACE AG SER QL: 0.21 INDEX
HBV SURFACE AG SER QL: NEGATIVE
HCT VFR BLD AUTO: 32.7 % (ref 36.6–50.3)
HCV AB SERPL QL IA: NONREACTIVE
HCV COMMENT,HCGAC: NORMAL
HGB BLD-MCNC: 11.1 G/DL (ref 12.1–17)
MCH RBC QN AUTO: 29.8 PG (ref 26–34)
MCHC RBC AUTO-ENTMCNC: 33.9 G/DL (ref 30–36.5)
MCV RBC AUTO: 87.9 FL (ref 80–99)
NRBC # BLD: 0 K/UL (ref 0–0.01)
NRBC BLD-RTO: 0 PER 100 WBC
PLATELET # BLD AUTO: 162 K/UL (ref 150–400)
PMV BLD AUTO: 11.1 FL (ref 8.9–12.9)
POTASSIUM SERPL-SCNC: 3.5 MMOL/L (ref 3.5–5.1)
PROT SERPL-MCNC: 5.2 G/DL (ref 6.4–8.2)
RBC # BLD AUTO: 3.72 M/UL (ref 4.1–5.7)
SODIUM SERPL-SCNC: 138 MMOL/L (ref 136–145)
SP1: NORMAL
SP2: NORMAL
SP3: NORMAL
WBC # BLD AUTO: 7.8 K/UL (ref 4.1–11.1)

## 2019-12-09 PROCEDURE — 74011000258 HC RX REV CODE- 258: Performed by: INTERNAL MEDICINE

## 2019-12-09 PROCEDURE — 80048 BASIC METABOLIC PNL TOTAL CA: CPT

## 2019-12-09 PROCEDURE — 65270000032 HC RM SEMIPRIVATE

## 2019-12-09 PROCEDURE — 87040 BLOOD CULTURE FOR BACTERIA: CPT

## 2019-12-09 PROCEDURE — 74011000250 HC RX REV CODE- 250: Performed by: INTERNAL MEDICINE

## 2019-12-09 PROCEDURE — 80076 HEPATIC FUNCTION PANEL: CPT

## 2019-12-09 PROCEDURE — 74011250636 HC RX REV CODE- 250/636: Performed by: INTERNAL MEDICINE

## 2019-12-09 PROCEDURE — 74011250637 HC RX REV CODE- 250/637: Performed by: INTERNAL MEDICINE

## 2019-12-09 PROCEDURE — 80074 ACUTE HEPATITIS PANEL: CPT

## 2019-12-09 PROCEDURE — 76700 US EXAM ABDOM COMPLETE: CPT

## 2019-12-09 PROCEDURE — 36415 COLL VENOUS BLD VENIPUNCTURE: CPT

## 2019-12-09 PROCEDURE — 85027 COMPLETE CBC AUTOMATED: CPT

## 2019-12-09 RX ORDER — HYDROXYZINE 25 MG/1
50 TABLET, FILM COATED ORAL ONCE
Status: COMPLETED | OUTPATIENT
Start: 2019-12-10 | End: 2019-12-10

## 2019-12-09 RX ORDER — DICYCLOMINE HYDROCHLORIDE 20 MG/1
20 TABLET ORAL ONCE
Status: COMPLETED | OUTPATIENT
Start: 2019-12-09 | End: 2019-12-09

## 2019-12-09 RX ADMIN — SODIUM CHLORIDE 10 MG: 9 INJECTION INTRAMUSCULAR; INTRAVENOUS; SUBCUTANEOUS at 16:05

## 2019-12-09 RX ADMIN — MORPHINE SULFATE 1 MG: 2 INJECTION, SOLUTION INTRAMUSCULAR; INTRAVENOUS at 13:25

## 2019-12-09 RX ADMIN — SODIUM CHLORIDE 10 MG: 9 INJECTION INTRAMUSCULAR; INTRAVENOUS; SUBCUTANEOUS at 03:10

## 2019-12-09 RX ADMIN — ONDANSETRON 4 MG: 2 INJECTION INTRAMUSCULAR; INTRAVENOUS at 13:29

## 2019-12-09 RX ADMIN — HEPARIN SODIUM 5000 UNITS: 5000 INJECTION INTRAVENOUS; SUBCUTANEOUS at 13:34

## 2019-12-09 RX ADMIN — MORPHINE SULFATE 1 MG: 2 INJECTION, SOLUTION INTRAMUSCULAR; INTRAVENOUS at 09:33

## 2019-12-09 RX ADMIN — CEFEPIME HYDROCHLORIDE 1 G: 1 INJECTION, POWDER, FOR SOLUTION INTRAMUSCULAR; INTRAVENOUS at 13:36

## 2019-12-09 RX ADMIN — ONDANSETRON 4 MG: 2 INJECTION INTRAMUSCULAR; INTRAVENOUS at 07:05

## 2019-12-09 RX ADMIN — SODIUM CHLORIDE 125 ML/HR: 900 INJECTION, SOLUTION INTRAVENOUS at 17:14

## 2019-12-09 RX ADMIN — MORPHINE SULFATE 1 MG: 2 INJECTION, SOLUTION INTRAMUSCULAR; INTRAVENOUS at 02:06

## 2019-12-09 RX ADMIN — Medication 10 ML: at 13:30

## 2019-12-09 RX ADMIN — SODIUM CHLORIDE 10 MG: 9 INJECTION INTRAMUSCULAR; INTRAVENOUS; SUBCUTANEOUS at 09:23

## 2019-12-09 RX ADMIN — FAMOTIDINE 20 MG: 20 TABLET ORAL at 08:24

## 2019-12-09 RX ADMIN — FAMOTIDINE 20 MG: 20 TABLET ORAL at 17:22

## 2019-12-09 RX ADMIN — HEPARIN SODIUM 5000 UNITS: 5000 INJECTION INTRAVENOUS; SUBCUTANEOUS at 02:06

## 2019-12-09 RX ADMIN — DICYCLOMINE HYDROCHLORIDE 20 MG: 20 TABLET ORAL at 05:39

## 2019-12-09 RX ADMIN — MORPHINE SULFATE 1 MG: 2 INJECTION, SOLUTION INTRAMUSCULAR; INTRAVENOUS at 05:39

## 2019-12-09 NOTE — PROGRESS NOTES
TRANSFER - IN REPORT:    [facility] Lizeth Lim  being received from  (unit) for routine progression of care      Report consisted of patients Situation, Background, Assessment and   Recommendations(SBAR). Information from the following report(s) SBAR, MAR and Recent Results was reviewed with the receiving nurse. Opportunity for questions and clarification was provided. Assessment will be completed upon patients arrival to unit and care assumed.

## 2019-12-09 NOTE — PROGRESS NOTES
TRANSFER - OUT REPORT:    Verbal report given to Kelly Maldonado RN(name) on Naa English  being transferred to (unit) for routine progression of care       Report consisted of patients Situation, Background, Assessment and   Recommendations(SBAR). Information from the following report(s) SBAR, Kardex, Intake/Output, MAR and Accordion was reviewed with the receiving nurse. Lines:   Peripheral IV 12/07/19 Left Forearm (Active)   Site Assessment Clean, dry, & intact 12/9/2019  8:34 AM   Phlebitis Assessment 0 12/9/2019  8:34 AM   Infiltration Assessment 0 12/9/2019  8:34 AM   Dressing Status Clean, dry, & intact 12/9/2019  8:34 AM   Dressing Type Transparent 12/9/2019  8:34 AM   Hub Color/Line Status Infusing 12/9/2019  8:34 AM   Action Taken Open ports on tubing capped 12/9/2019  8:34 AM   Alcohol Cap Used Yes 12/9/2019  8:34 AM        Opportunity for questions and clarification was provided.       Patient transported with:   Registered Nurse

## 2019-12-09 NOTE — PROGRESS NOTES
RN spoke with nuclear medicine. Pt needs to be NPO at midnight and receive no morphine or any morphine derivative after 0400 on 12/10/19.

## 2019-12-09 NOTE — PROGRESS NOTES
TRANSFER - IN REPORT:    Verbal report received from MARCELL Rodrigues(name) on Princess Hendrix  being received from [facility]) for routine progression of care      Report consisted of patients Situation, Background, Assessment and   Recommendations(SBAR). Information from the following report(s) SBAR, Kardex, ED Summary, Intake/Output and MAR was reviewed with the receiving nurse. Opportunity for questions and clarification was provided. Assessment completed upon patients arrival to unit and care assumed.

## 2019-12-09 NOTE — PROGRESS NOTES
Bedside and Verbal shift change report given to Sharyn Osgood, RN (oncoming nurse) by Lavell Moore RN (offgoing nurse). Report included the following information SBAR and Kardex.

## 2019-12-09 NOTE — PHYSICIAN ADVISORY
Letter of Status Determination: Current Status INPATIENT is Appropriate Pt Name:  Jenny Morillo MR#  [id number] [facility]#   [id number] Room [facility] Hospital  552/01  @ [facility]  
Hospitalization date  12/7/2019 10:03 AM  
Current Attending Physician  Irving Robison MD  
Principal diagnosis  Sepsis due to Pyelonephritis Acute kidney injury Hyponatremia. Clinicals  31 y/o gentleman with a PMH significant for Septicemia, Attention Deficit Hyperactivity disorder, Depression and Anxiety Disorder was brought to the hospital for complaints of an approximately one week history of not feeling well with bilateral lower abdominal and flank pain, nausea and vomiting. Patient's initial evaluation was consistent with sepsis due to probable Pyelonephritis with positive urinalysis, leukocytosis, and lactic acidemia. Patient was cultured and started on broad coverage IV antibiotics along with IVFs. Patient also noted with a severe Hyponatremia present on admission. Patient is requiring IV opiate therapy and IV anti-emetics for optimal control of symptoms. [place] MCG criteria Does  apply STATUS DETERMINATION  On the basis of clinical data, available documentaion, we believe that the current status of this patient as INPATIENT is Appropriate. This patient is at high risk of adverse events and deterioration based on documented clinical data, comorbid conditions and current acute care course. The final decision of the patient's hospitalization status depends on the Attending Physician's judgement. Additional comments Insurance  Payor: SELF PAY / Plan: [employer] SELF PAY / Product Type: Self Pay / Insurance Information SELF PAY/BS[employer] SELF PAY Phone: [phone] Subscriber: Prince Reilly Subscriber#: [id number]  Group#:  Precert#:   
  
 
 
The information in this document is a recommendation to be used for utilization review and utilization management purposes only. This recommendation is not an order. The recommendation is made based on the information reviewed at the time of the referral, is pursuant to the [facility] Conditions of Participation (42 CFR Part 482), and is neither a judgment nor an assessment with regard to the appropriateness or quality of clinical care. For all Managed Care patients: The Criteria are intended solely for use as screening guidelines with respect to the medical appropriateness of healthcare services and not for final clinical or payment determinations concerning the type or level of medical care provided, or proposed to be provided, to a patient. They help the reviewers determine whether a patient is appropriate for observation or inpatient admission at the time a decision to admit the patient is being made. All efforts are made to apply the pertinent payor criteria (MCG or [employer]) as well as the clinical judgements based on the information reviewed at the time of the referral.\" Nothing in this document may be used to limit, alter, or affect clinical services provided to the patient named. Elly Solorzano MD [facility] Phys[facility] Advisor [place] [phone] [email or web address] 
 
8:25 AM 12/9/2019

## 2019-12-09 NOTE — PROGRESS NOTES
Primary Nurse Alexandrea Haro RN and Gilda Sidhu RN performed a dual skin assessment on this patient No impairment noted  Orlin score is 21.

## 2019-12-09 NOTE — PROGRESS NOTES
Bedside and Verbal shift change report given to Saranya Espinoza RN (oncoming nurse) by Vanesa Walker RN (offgoing nurse). Report included the following information SBAR, Kardex and MAR.

## 2019-12-09 NOTE — CONSULTS
[facility] [address] Consultation      Admit Date: 12/7/2019  Reason for Consultation: sepsis of unknown source. ? gallbladder    HPI:  Prince Vasques is a 30 y.o. male whom we are asked to see in consultation for sepsis of unknown source. He presented to the ED on 12/7/19 with one week history of vomiting dark fluid and bile, left lower quadrant that moved to his RUQ over the last 2 days, nausea with minimal PO intake over the last week, and constipation. He was admitted with sepsis, JM, and lactic acidosis. Has been treated with cefepime for the last 2 days. At present his leukocytosis and abdominal pain have resolved. Blood culture prelim result = GPC in 1/2 bottles drawn. Abd US:  GALLBLADDER: Contains sludge as well as a 9 mm polyp. Small amount pericholecystic fluid with wall thickening up to 4.3 mm. COMMON BILE DUCT: 3 mm in diameter. No significant dilatation. IMPRESSION:   Small amount of gallbladder sludge, with wall thickening up to 4.3 mm and mild  pericholecystic fluid. 9 mm gallbladder polyp or adherent stone. No biliary dilatation. CT A/P 12/7/19: no acute findings. No surgical history. He was recently treated for sepsis secondary to pneumonia. Has been taking clear liquids today. Patient Active Problem List    Diagnosis Date Noted    Sepsis ([address] 12/07/2019    Abdominal pain 03/30/2012    Diarrhea 03/30/2012    Nausea 03/30/2012     Past Medical History:   Diagnosis Date    ADD (attention deficit disorder with hyperactivity)     Anxiety     Depression     Fracture, toe     little left    Septicemia ([address]       History reviewed. No pertinent surgical history.    Social History     Tobacco Use    Smoking status: Current Some Day Smoker     Packs/day: 0.25    Smokeless tobacco: Never Used   Substance Use Topics    Alcohol use: No      Family History   Problem Relation Age of Onset    Psychiatric Disorder Mother         Bipolar    Cancer Father         Lymphoma    Heart Disease Maternal Grandfather     Hypertension Maternal Grandfather     Heart Disease Paternal Grandfather     Hypertension Paternal Grandfather       Prior to Admission medications    Not on File     Current Facility-Administered Medications   Medication Dose Route Frequency    nicotine (NICODERM CQ) 14 mg/24 hr patch 1 Patch  1 Patch TransDERmal DAILY    famotidine (PEPCID) tablet 20 mg  20 mg Oral BID    sodium chloride (NS) flush 5-40 mL  5-40 mL IntraVENous Q8H    sodium chloride (NS) flush 5-40 mL  5-40 mL IntraVENous PRN    acetaminophen (TYLENOL) tablet 650 mg  650 mg Oral Q4H PRN    cefepime (MAXIPIME) 1 g in 0.9% sodium chloride (MBP/ADV) 50 mL  1 g IntraVENous Q24H    0.9% sodium chloride infusion  125 mL/hr IntraVENous CONTINUOUS    sodium chloride (NS) flush 5-40 mL  5-40 mL IntraVENous Q8H    sodium chloride (NS) flush 5-40 mL  5-40 mL IntraVENous PRN    heparin (porcine) injection 5,000 Units  5,000 Units SubCUTAneous Q12H    ondansetron (ZOFRAN) injection 4 mg  4 mg IntraVENous Q4H PRN    influenza vaccine - (6 mos+)(PF) (FLUARIX/FLULAVAL/FLUZONE QUAD) injection 0.5 mL  0.5 mL IntraMUSCular PRIOR TO DISCHARGE    prochlorperazine (COMPAZINE) with saline injection 10 mg  10 mg IntraVENous Q6H PRN    morphine injection 1 mg  1 mg IntraVENous Q4H PRN     Allergies   Allergen Reactions    Buspar [Buspirone] Unknown (comments)     Patient reported having \"anaphylactic\" shock in ~          Subjective:     Review of Systems:    A comprehensive review of systems was negative except for that written in the History of Present Illness. Objective:     Blood pressure (!) 148/93, pulse 74, temperature 98.8 °F (37.1 °C), resp. rate 16, weight 59 kg (130 lb), SpO2 98 %.   Temp (24hrs), Av.6 °F (37 °C), Min:98.2 °F (36.8 °C), Max:99.4 °F (37.4 °C)      Recent Labs     19  0209 19  0314 12/07/19  1029   WBC 7.8 10.4 15.3*   HGB 11.1* 12.5 19.7*   HCT 32.7* 36.6 55.4*    199 332     Recent Labs     12/09/19  0209 12/08/19  0345 12/08/19  0316 12/07/19  1029     --  137 129*   K 3.5  --  3.4* 3.5     --  107 91*   CO2 26  --  26 16*   GLU 99  --  117* 161*   BUN 26*  --  49* 89*   CREA 0.99  --  1.69* 4.60*   CA 7.8*  --  7.6* 9.8   MG  --  2.7*  --   --    ALB  --   --   --  5.7*   TBILI  --   --   --  2.3*   SGOT  --   --   --  22   ALT  --   --   --  23     Recent Labs     12/07/19  1029   LPSE 52*         Intake/Output Summary (Last 24 hours) at 12/9/2019 1340  Last data filed at 12/9/2019 0824  Gross per 24 hour   Intake 2715 ml   Output    Net 2715 ml     Date 12/09/19 0700 - 12/10/19 0659   Shift 5961-4007 5853-3987 8125-8523 24 Hour Total   INTAKE   P.O. 240   240   I. V.(mL/kg/hr) 1600   1600   Shift Total(mL/kg) 1840(31.2)   1840(31.2)   OUTPUT   Shift Total(mL/kg)       Weight (kg) 59 59 59 59     _____________________  Physical Exam:     General:  Alert, cooperative, no distress, appears stated age. Eyes:   EOMs intact. Sclera clear. Throat: Lips, mucosa, and tongue normal.   Neck: Supple, symmetrical, trachea midline. Lungs:   Clear to auscultation bilaterally. Heart:  Regular rate and rhythm. Abdomen:   Normal BS, flat, Soft, focal tenderness RUQ. Extremities: Extremities normal, atraumatic, no cyanosis or edema. Skin: Skin color, texture, turgor normal. No rashes or lesions. Assessment:   Active Problems:    Sepsis ([address] (12/7/2019)    RUQ pain and cholelithiasis        Plan: Will get HIDA to definitely assess for acute cholecystitis  F/u hepatic panel  Further plan per Dr. Joanne Tran    Thank you for allowing us to participate in the care of this patient. Total time spent with patient: 20 minutes. Signed By: Rajendra Dsouza NP     December 9, 2019      Patient independently interviewed and examined; agree with NP assessment.  He complains of RUQ pain, admitted with signs of sepsis; overall clinically improved but still with RUQ pain. On exam, he has mild upper abdominal pain with palpation. WBC count is normal, and Ultrasound with sludge and mild wall thickening. Agree with plans for HIDA scan in AM to assess for objective evidence of acute cholecystitis. Continue current plan for now.

## 2019-12-10 ENCOUNTER — APPOINTMENT (OUTPATIENT)
Dept: NUCLEAR MEDICINE | Age: 29
DRG: 720 | End: 2019-12-10
Attending: NURSE PRACTITIONER
Payer: MEDICAID

## 2019-12-10 PROCEDURE — 74011250637 HC RX REV CODE- 250/637: Performed by: NURSE PRACTITIONER

## 2019-12-10 PROCEDURE — 74011000258 HC RX REV CODE- 258: Performed by: INTERNAL MEDICINE

## 2019-12-10 PROCEDURE — 74011250636 HC RX REV CODE- 250/636: Performed by: INTERNAL MEDICINE

## 2019-12-10 PROCEDURE — 74011000250 HC RX REV CODE- 250: Performed by: INTERNAL MEDICINE

## 2019-12-10 PROCEDURE — 65270000032 HC RM SEMIPRIVATE

## 2019-12-10 PROCEDURE — 74011250637 HC RX REV CODE- 250/637: Performed by: INTERNAL MEDICINE

## 2019-12-10 PROCEDURE — A9537 TC99M MEBROFENIN: HCPCS

## 2019-12-10 RX ORDER — SODIUM CHLORIDE 0.9 % (FLUSH) 0.9 %
SYRINGE (ML) INJECTION
Status: DISPENSED
Start: 2019-12-10 | End: 2019-12-10

## 2019-12-10 RX ORDER — SODIUM CHLORIDE 9 MG/ML
25 INJECTION, SOLUTION INTRAVENOUS
Status: COMPLETED | OUTPATIENT
Start: 2019-12-10 | End: 2019-12-10

## 2019-12-10 RX ORDER — SODIUM CHLORIDE 0.9 % (FLUSH) 0.9 %
10 SYRINGE (ML) INJECTION
Status: COMPLETED | OUTPATIENT
Start: 2019-12-10 | End: 2019-12-10

## 2019-12-10 RX ADMIN — SODIUM CHLORIDE 125 ML/HR: 900 INJECTION, SOLUTION INTRAVENOUS at 17:46

## 2019-12-10 RX ADMIN — CEFEPIME HYDROCHLORIDE 2 G: 2 INJECTION, POWDER, FOR SOLUTION INTRAVENOUS at 15:03

## 2019-12-10 RX ADMIN — Medication 10 ML: at 08:05

## 2019-12-10 RX ADMIN — SODIUM CHLORIDE 10 MG: 9 INJECTION INTRAMUSCULAR; INTRAVENOUS; SUBCUTANEOUS at 11:52

## 2019-12-10 RX ADMIN — MORPHINE SULFATE 1 MG: 2 INJECTION, SOLUTION INTRAMUSCULAR; INTRAVENOUS at 03:17

## 2019-12-10 RX ADMIN — Medication 10 ML: at 15:04

## 2019-12-10 RX ADMIN — Medication 10 ML: at 21:37

## 2019-12-10 RX ADMIN — HEPARIN SODIUM 5000 UNITS: 5000 INJECTION INTRAVENOUS; SUBCUTANEOUS at 15:03

## 2019-12-10 RX ADMIN — Medication 10 ML: at 05:37

## 2019-12-10 RX ADMIN — MORPHINE SULFATE 1 MG: 2 INJECTION, SOLUTION INTRAMUSCULAR; INTRAVENOUS at 10:21

## 2019-12-10 RX ADMIN — SINCALIDE 1.18 MCG: 5 INJECTION, POWDER, LYOPHILIZED, FOR SOLUTION INTRAVENOUS at 09:09

## 2019-12-10 RX ADMIN — FAMOTIDINE 20 MG: 20 TABLET ORAL at 17:44

## 2019-12-10 RX ADMIN — SODIUM CHLORIDE 25 ML: 900 INJECTION, SOLUTION INTRAVENOUS at 09:09

## 2019-12-10 RX ADMIN — ONDANSETRON 4 MG: 2 INJECTION INTRAMUSCULAR; INTRAVENOUS at 10:19

## 2019-12-10 RX ADMIN — CEFEPIME HYDROCHLORIDE 2 G: 2 INJECTION, POWDER, FOR SOLUTION INTRAVENOUS at 03:17

## 2019-12-10 RX ADMIN — SODIUM CHLORIDE 125 ML/HR: 900 INJECTION, SOLUTION INTRAVENOUS at 05:36

## 2019-12-10 RX ADMIN — MORPHINE SULFATE 1 MG: 2 INJECTION, SOLUTION INTRAMUSCULAR; INTRAVENOUS at 15:37

## 2019-12-10 RX ADMIN — HYDROXYZINE HYDROCHLORIDE 50 MG: 25 TABLET, FILM COATED ORAL at 00:10

## 2019-12-10 RX ADMIN — FAMOTIDINE 20 MG: 20 TABLET ORAL at 10:22

## 2019-12-10 NOTE — PROGRESS NOTES
Hospitalist Progress Note  Delmar Abraham MD  Answering service: [phone] from in house phone      Date of Service:  12/10/2019  NAME:  Barry Dean  :  1990  MRN:  [id number]      Admission Summary:   Mr Rajesh Morrissey is 31-year-old male with no significant medical history presents to the hospital with chief complaint of not feeling well of 1 week duration. Patient stated that for the last 1 week he was not feeling well in which he described as having both right and left lower lower abdominal pain, flan pain and pain on his epigastric area associated with nausea and recurrent vomiting. Patient also stated that he was very diaphoretic having pain during urination with dark color urine. He was having additional urinary symptoms with needing to strain before urination and poor intermittent flow. He was also having some difficulty breathing and wheezing . He denies fever, chest pain, palpitation or cough. Patient was last admitted in the hospital in  for sepsis due to community-acquired pneumonia. Patient stated that he has multiple admission for septicemia even though record shows that he has been admitted once in this hospital.    Interval history / Subjective:      Patient seen and examined. Feels little better, no vomiting yesterday, was having lots of vomiting    Assessment & Plan:     # Sepsis no clear source   due to suspected pyelonephritis vs cholecystitis   Supported by tachycardia, high white cell count and elevated lactic acid, +UA,  associated nausea and vomiting   - UA with bacteria, urine culture no growth   - blood culture gram +ve cocci 1/4 bottle and no growth in others.   - CT abdomen no hydronephrosis, no appendicitis   - RUQ abdominal US  Small amount of gallbladder sludge, with wall thickening up to 4.3 mm and mild pericholecystic fluid.  9 mm gallbladder polyp or adherent stone  - ct with cefepime 12/7--present   - supportive measures: on IVF, Zofran and pain medication   - lactic acidosis resolved   - surgery consult- HIDA scan -ve     # Anion gap metabolic acidosis: resolved likely from lactic acidosis    # JM: Resolved    # Sob/wheeze: chest x ray unremarkable. Nebs PRN - resolved. # RUQ/Epigastric pain: trial of Pepcid.  US as above. Plan as above   # Hyponatremia: resolved after IVF      Code: Full   DVT prop: Heparin   Dispo: Home      Hospital Problems  Date Reviewed: 1/31/2014          Codes Class Noted POA    Sepsis ([facility] [address] ICD-10-CM: A41.9  ICD-9-CM: 038.9, 995.91  12/7/2019 Unknown              Review of Systems:   Pertinent positive mentioned in interval hx/HPI. Other systems reviewed and negative     Vital Signs:    Last 24hrs VS reviewed since prior progress note. Most recent are:  Visit Vitals  BP (!) 181/98 (BP 1 Location: Left arm, BP Patient Position: At rest)   Pulse 62   Temp 98.3 °F (36.8 °C)   Resp 16   Wt 59 kg (130 lb)   SpO2 100%   BMI 19.77 kg/m²         Intake/Output Summary (Last 24 hours) at 12/10/2019 1344  Last data filed at 12/10/2019 0241  Gross per 24 hour   Intake    Output 850 ml   Net -850 ml        Physical Examination:     Constitutional:  No acute distress, cooperative, pleasant        Resp:  CTA bilaterally. No wheezing/rhonchi/rales. No accessory muscle use   CV:  Regular rhythm, normal rate, no murmurs    GI:  Soft, non distended, mild tenderness on RUQ. Musculoskeletal:  No edema, warm    Neurologic:  Moves all extremities.   AAOx3     Data Review:   I personally reviewed labs and imaging     Labs:     Recent Labs     12/09/19  0209 12/08/19  0316   WBC 7.8 10.4   HGB 11.1* 12.5   HCT 32.7* 36.6    199     Recent Labs     12/09/19  0209 12/08/19  0345 12/08/19  0316     --  137   K 3.5  --  3.4*     --  107   CO2 26  --  26   BUN 26*  --  49*   CREA 0.99  --  1.69*   GLU 99  --  117*   CA 7.8*  --  7.6*   MG  --  2.7*  --      Recent Labs 12/09/19  0209   SGOT 21   ALT 17   AP 46   TBILI 2.5*   TP 5.2*   ALB 3.3*   GLOB 1.9*     No results for input(s): INR, PTP, APTT, INREXT, INREXT in the last 72 hours. No results for input(s): FE, TIBC, PSAT, FERR in the last 72 hours. No results found for: FOL, RBCF   No results for input(s): PH, PCO2, PO2 in the last 72 hours. No results for input(s): CPK, CKNDX, TROIQ in the last 72 hours.     No lab exists for component: CPKMB  No results found for: CHOL, CHOLX, CHLST, CHOLV, HDL, HDLP, LDL, LDLC, DLDLP, TGLX, TRIGL, TRIGP, CHHD, CHHDX  Lab Results   Component Value Date/Time    Glucose (POC) 98 10/13/2010 08:00 AM    Glucose (POC) 105 10/12/2010 10:05 PM    Glucose (POC) 151 (H) 10/12/2010 11:51 AM    Glucose (POC) 195 (H) 10/12/2010 07:24 AM     Lab Results   Component Value Date/Time    Color DARK YELLOW 12/07/2019 11:21 AM    Appearance CLOUDY (A) 12/07/2019 11:21 AM    Specific gravity 1.021 12/07/2019 11:21 AM    pH (UA) 5.0 12/07/2019 11:21 AM    Protein 100 (A) 12/07/2019 11:21 AM    Glucose NEGATIVE  12/07/2019 11:21 AM    Ketone NEGATIVE  12/07/2019 11:21 AM    Bilirubin Negative 01/31/2014 11:15 AM    Urobilinogen 0.2 12/07/2019 11:21 AM    Nitrites NEGATIVE  12/07/2019 11:21 AM    Leukocyte Esterase NEGATIVE  12/07/2019 11:21 AM    Epithelial cells FEW 12/07/2019 11:21 AM    Bacteria 1+ (A) 12/07/2019 11:21 AM    WBC 5-10 12/07/2019 11:21 AM    RBC 5-10 12/07/2019 11:21 AM         Medications Reviewed:     Current Facility-Administered Medications   Medication Dose Route Frequency    sincalide (KINEVAC) 5 mcg injection        sodium chloride (NS) flush        cefepime (MAXIPIME) 2 g in 0.9% sodium chloride (MBP/ADV) 100 mL  2 g IntraVENous Q12H    nicotine (NICODERM CQ) 14 mg/24 hr patch 1 Patch  1 Patch TransDERmal DAILY    famotidine (PEPCID) tablet 20 mg  20 mg Oral BID    sodium chloride (NS) flush 5-40 mL  5-40 mL IntraVENous Q8H    sodium chloride (NS) flush 5-40 mL  5-40 mL IntraVENous PRN    acetaminophen (TYLENOL) tablet 650 mg  650 mg Oral Q4H PRN    0.9% sodium chloride infusion  125 mL/hr IntraVENous CONTINUOUS    sodium chloride (NS) flush 5-40 mL  5-40 mL IntraVENous Q8H    sodium chloride (NS) flush 5-40 mL  5-40 mL IntraVENous PRN    heparin (porcine) injection 5,000 Units  5,000 Units SubCUTAneous Q12H    ondansetron (ZOFRAN) injection 4 mg  4 mg IntraVENous Q4H PRN    influenza vaccine 2019-20 (6 mos+)(PF) (FLUARIX/FLULAVAL/FLUZONE QUAD) injection 0.5 mL  0.5 mL IntraMUSCular PRIOR TO DISCHARGE    prochlorperazine (COMPAZINE) with saline injection 10 mg  10 mg IntraVENous Q6H PRN    morphine injection 1 mg  1 mg IntraVENous Q4H PRN     ______________________________________________________________________  EXPECTED LENGTH OF STAY: 3d 16h  ACTUAL LENGTH OF STAY:          3                 Smith Burgos MD   Patient has given Verbal permission to discuss medical care with   persons present in the room and and also with contact as listed on face sheet.

## 2019-12-10 NOTE — PROGRESS NOTES
Problem: Falls - Risk of  Goal: *Absence of Falls  Description  Document Fred  Fall Risk and appropriate interventions in the flowsheet.   Outcome: Progressing Towards Goal  Note: Fall Risk Interventions:            Medication Interventions: Evaluate medications/consider consulting pharmacy    Elimination Interventions: Call light in reach              Problem: Patient Education: Go to Patient Education Activity  Goal: Patient/Family Education  Outcome: Progressing Towards Goal

## 2019-12-11 PROCEDURE — 74011250637 HC RX REV CODE- 250/637: Performed by: INTERNAL MEDICINE

## 2019-12-11 PROCEDURE — 74011250636 HC RX REV CODE- 250/636: Performed by: INTERNAL MEDICINE

## 2019-12-11 PROCEDURE — 65270000032 HC RM SEMIPRIVATE

## 2019-12-11 PROCEDURE — 74011000250 HC RX REV CODE- 250: Performed by: INTERNAL MEDICINE

## 2019-12-11 PROCEDURE — 74011000258 HC RX REV CODE- 258: Performed by: INTERNAL MEDICINE

## 2019-12-11 RX ORDER — LORAZEPAM 1 MG/1
1 TABLET ORAL
Status: DISCONTINUED | OUTPATIENT
Start: 2019-12-11 | End: 2019-12-12 | Stop reason: HOSPADM

## 2019-12-11 RX ADMIN — Medication 10 ML: at 05:51

## 2019-12-11 RX ADMIN — Medication 10 ML: at 23:04

## 2019-12-11 RX ADMIN — LORAZEPAM 1 MG: 1 TABLET ORAL at 11:10

## 2019-12-11 RX ADMIN — Medication 10 ML: at 23:03

## 2019-12-11 RX ADMIN — FAMOTIDINE 20 MG: 20 TABLET ORAL at 09:21

## 2019-12-11 RX ADMIN — LORAZEPAM 1 MG: 1 TABLET ORAL at 23:04

## 2019-12-11 RX ADMIN — SODIUM CHLORIDE 10 MG: 9 INJECTION INTRAMUSCULAR; INTRAVENOUS; SUBCUTANEOUS at 05:58

## 2019-12-11 RX ADMIN — FAMOTIDINE 20 MG: 20 TABLET ORAL at 18:19

## 2019-12-11 RX ADMIN — Medication 10 ML: at 13:46

## 2019-12-11 RX ADMIN — SODIUM CHLORIDE 125 ML/HR: 900 INJECTION, SOLUTION INTRAVENOUS at 09:28

## 2019-12-11 RX ADMIN — SODIUM CHLORIDE 125 ML/HR: 900 INJECTION, SOLUTION INTRAVENOUS at 01:46

## 2019-12-11 RX ADMIN — CEFEPIME HYDROCHLORIDE 2 G: 2 INJECTION, POWDER, FOR SOLUTION INTRAVENOUS at 03:52

## 2019-12-11 RX ADMIN — HEPARIN SODIUM 5000 UNITS: 5000 INJECTION INTRAVENOUS; SUBCUTANEOUS at 13:46

## 2019-12-11 RX ADMIN — MORPHINE SULFATE 1 MG: 2 INJECTION, SOLUTION INTRAMUSCULAR; INTRAVENOUS at 05:50

## 2019-12-11 RX ADMIN — CEFEPIME HYDROCHLORIDE 2 G: 2 INJECTION, POWDER, FOR SOLUTION INTRAVENOUS at 15:54

## 2019-12-11 NOTE — PROGRESS NOTES
Hospitalist Progress Note  Delmar Abraham MD  Answering service: [phone] from in house phone      Date of Service:  2019  NAME:  Barry Dean  :  1990  MRN:  [id number]      Admission Summary:   Mr Rajesh Morrissey is 27-year-old male with no significant medical history presents to the hospital with chief complaint of not feeling well of 1 week duration. Patient stated that for the last 1 week he was not feeling well in which he described as having both right and left lower lower abdominal pain, flan pain and pain on his epigastric area associated with nausea and recurrent vomiting. Patient also stated that he was very diaphoretic having pain during urination with dark color urine. He was having additional urinary symptoms with needing to strain before urination and poor intermittent flow. He was also having some difficulty breathing and wheezing . He denies fever, chest pain, palpitation or cough. Patient was last admitted in the hospital in  for sepsis due to community-acquired pneumonia. Patient stated that he has multiple admission for septicemia even though record shows that he has been admitted once in this hospital.    Interval history / Subjective:      Patient seen and examined. Feels ok, now, was very anxious this morning, thought of leaving AMA, better now. Agrees to plan to advance diet to GI lite, if tolerates will dc tomorrow, if doesn't tolerate will need [place]. Assessment & Plan:     #.  Suspect chronic cholecystitis  # Sepsis no clear source - resolved  due to suspected pyelonephritis vs cholecystitis   Supported by tachycardia, high white cell count and elevated lactic acid, +UA,  associated nausea and vomiting   - UA with bacteria, urine culture no growth - lactic acidosis resolved   - blood culture gram +ve cocci 1/4 bottle and no growth in others.   - CT abdomen no hydronephrosis, no appendicitis   - RUQ abdominal US  Small amount of gallbladder sludge, with wall thickening up to 4.3 mm and mild pericholecystic fluid. 9 mm gallbladder polyp or adherent stone  - ct with cefepime 12/7--present   - supportive measures: on IVF, Zofran and pain medication   - surgery consult- HIDA scan -ve  - advance diet to GI lite, if tolerates will dc tomorrow, if doesn't tolerate will need [place].     # Anion gap metabolic acidosis: resolved likely from lactic acidosis    # JM: Resolved    # Sob/wheeze: chest x ray unremarkable. Nebs PRN - resolved. # RUQ/Epigastric pain: trial of Pepcid.  US as above. Plan as above   # Hyponatremia: resolved after IVF      Code: Full   DVT prop: Heparin   Dispo: Home      Hospital Problems  Date Reviewed: 1/31/2014          Codes Class Noted POA    Sepsis ([facility] [address] ICD-10-CM: A41.9  ICD-9-CM: 038.9, 995.91  12/7/2019 Unknown            Review of Systems:   Pertinent positive mentioned in interval hx/HPI. Other systems reviewed and negative     Vital Signs:    Last 24hrs VS reviewed since prior progress note. Most recent are:  Visit Vitals  BP (!) 156/97 (BP 1 Location: Right arm, BP Patient Position: Sitting)   Pulse 65   Temp 98 °F (36.7 °C)   Resp 16   Wt 59 kg (130 lb)   SpO2 100%   BMI 19.77 kg/m²         Intake/Output Summary (Last 24 hours) at 12/11/2019 1053  Last data filed at 12/11/2019 0848  Gross per 24 hour   Intake    Output 1225 ml   Net -1225 ml        Physical Examination:     Constitutional:  No acute distress, cooperative, pleasant        Resp:  CTA bilaterally. No wheezing/rhonchi/rales. No accessory muscle use   CV:  Regular rhythm, normal rate, no murmurs    GI:  Soft, non distended, mild tenderness on RUQ. Musculoskeletal:  No edema, warm    Neurologic:  Moves all extremities.   AAOx3     Data Review:   I personally reviewed labs and imaging     Labs:     Recent Labs     12/09/19  0209   WBC 7.8   HGB 11.1*   HCT 32.7*        Recent Labs 12/09/19  0209      K 3.5      CO2 26   BUN 26*   CREA 0.99   GLU 99   CA 7.8*     Recent Labs     12/09/19  0209   SGOT 21   ALT 17   AP 46   TBILI 2.5*   TP 5.2*   ALB 3.3*   GLOB 1.9*     No results for input(s): INR, PTP, APTT, INREXT, INREXT in the last 72 hours. No results for input(s): FE, TIBC, PSAT, FERR in the last 72 hours. No results found for: FOL, RBCF   No results for input(s): PH, PCO2, PO2 in the last 72 hours. No results for input(s): CPK, CKNDX, TROIQ in the last 72 hours.     No lab exists for component: CPKMB  No results found for: CHOL, CHOLX, CHLST, CHOLV, HDL, HDLP, LDL, LDLC, DLDLP, TGLX, TRIGL, TRIGP, CHHD, CHHDX  Lab Results   Component Value Date/Time    Glucose (POC) 98 10/13/2010 08:00 AM    Glucose (POC) 105 10/12/2010 10:05 PM    Glucose (POC) 151 (H) 10/12/2010 11:51 AM    Glucose (POC) 195 (H) 10/12/2010 07:24 AM     Lab Results   Component Value Date/Time    Color DARK YELLOW 12/07/2019 11:21 AM    Appearance CLOUDY (A) 12/07/2019 11:21 AM    Specific gravity 1.021 12/07/2019 11:21 AM    pH (UA) 5.0 12/07/2019 11:21 AM    Protein 100 (A) 12/07/2019 11:21 AM    Glucose NEGATIVE  12/07/2019 11:21 AM    Ketone NEGATIVE  12/07/2019 11:21 AM    Bilirubin Negative 01/31/2014 11:15 AM    Urobilinogen 0.2 12/07/2019 11:21 AM    Nitrites NEGATIVE  12/07/2019 11:21 AM    Leukocyte Esterase NEGATIVE  12/07/2019 11:21 AM    Epithelial cells FEW 12/07/2019 11:21 AM    Bacteria 1+ (A) 12/07/2019 11:21 AM    WBC 5-10 12/07/2019 11:21 AM    RBC 5-10 12/07/2019 11:21 AM         Medications Reviewed:     Current Facility-Administered Medications   Medication Dose Route Frequency    LORazepam (ATIVAN) tablet 1 mg  1 mg Oral Q12H PRN    cefepime (MAXIPIME) 2 g in 0.9% sodium chloride (MBP/ADV) 100 mL  2 g IntraVENous Q12H    nicotine (NICODERM CQ) 14 mg/24 hr patch 1 Patch  1 Patch TransDERmal DAILY    famotidine (PEPCID) tablet 20 mg  20 mg Oral BID    sodium chloride (NS) flush 5-40 mL  5-40 mL IntraVENous Q8H    sodium chloride (NS) flush 5-40 mL  5-40 mL IntraVENous PRN    acetaminophen (TYLENOL) tablet 650 mg  650 mg Oral Q4H PRN    sodium chloride (NS) flush 5-40 mL  5-40 mL IntraVENous Q8H    sodium chloride (NS) flush 5-40 mL  5-40 mL IntraVENous PRN    heparin (porcine) injection 5,000 Units  5,000 Units SubCUTAneous Q12H    ondansetron (ZOFRAN) injection 4 mg  4 mg IntraVENous Q4H PRN    influenza vaccine 2019-20 (6 mos+)(PF) (FLUARIX/FLULAVAL/FLUZONE QUAD) injection 0.5 mL  0.5 mL IntraMUSCular PRIOR TO DISCHARGE    prochlorperazine (COMPAZINE) with saline injection 10 mg  10 mg IntraVENous Q6H PRN    morphine injection 1 mg  1 mg IntraVENous Q4H PRN     ______________________________________________________________________  EXPECTED LENGTH OF STAY: 3d 16h  ACTUAL LENGTH OF STAY:          4                 Smith Burgos MD   Patient has given Verbal permission to discuss medical care with   persons present in the room and and also with contact as listed on face sheet.

## 2019-12-11 NOTE — PROGRESS NOTES
HIDA negative for acute cholecystitis or biliary dyskinesia. Recommend advancing diet and assessing tolerance; if symptoms persist may be role for cholecystectomy if we believe symptoms attributable to chronic cholecystitis.

## 2019-12-11 NOTE — PROGRESS NOTES
Problem: Falls - Risk of  Goal: *Absence of Falls  Description  Document Trish Dennis Fall Risk and appropriate interventions in the flowsheet.   Outcome: Progressing Towards Goal  Note: Fall Risk Interventions:            Medication Interventions: Evaluate medications/consider consulting pharmacy    Elimination Interventions: Call light in reach              Problem: Patient Education: Go to Patient Education Activity  Goal: Patient/Family Education  Outcome: Progressing Towards Goal

## 2019-12-12 VITALS
HEART RATE: 82 BPM | WEIGHT: 130 LBS | OXYGEN SATURATION: 100 % | DIASTOLIC BLOOD PRESSURE: 98 MMHG | SYSTOLIC BLOOD PRESSURE: 168 MMHG | TEMPERATURE: 97.9 F | RESPIRATION RATE: 19 BRPM | BODY MASS INDEX: 19.77 KG/M2

## 2019-12-12 PROCEDURE — 74011250637 HC RX REV CODE- 250/637: Performed by: INTERNAL MEDICINE

## 2019-12-12 PROCEDURE — 74011250636 HC RX REV CODE- 250/636: Performed by: INTERNAL MEDICINE

## 2019-12-12 PROCEDURE — 74011000258 HC RX REV CODE- 258: Performed by: INTERNAL MEDICINE

## 2019-12-12 RX ORDER — ONDANSETRON 4 MG/1
4 TABLET, FILM COATED ORAL
Qty: 30 TAB | Refills: 0 | Status: SHIPPED | OUTPATIENT
Start: 2019-12-12 | End: 2019-12-22

## 2019-12-12 RX ORDER — FAMOTIDINE 20 MG/1
20 TABLET, FILM COATED ORAL 2 TIMES DAILY
Qty: 60 TAB | Refills: 0 | Status: SHIPPED | OUTPATIENT
Start: 2019-12-12 | End: 2020-01-11

## 2019-12-12 RX ORDER — AMLODIPINE BESYLATE 5 MG/1
5 TABLET ORAL DAILY
Qty: 30 TAB | Refills: 0 | Status: SHIPPED | OUTPATIENT
Start: 2019-12-12 | End: 2020-01-11

## 2019-12-12 RX ORDER — HYDRALAZINE HYDROCHLORIDE 20 MG/ML
20 INJECTION INTRAMUSCULAR; INTRAVENOUS
Status: DISCONTINUED | OUTPATIENT
Start: 2019-12-12 | End: 2019-12-12 | Stop reason: HOSPADM

## 2019-12-12 RX ADMIN — LORAZEPAM 1 MG: 1 TABLET ORAL at 11:07

## 2019-12-12 RX ADMIN — CEFEPIME HYDROCHLORIDE 2 G: 2 INJECTION, POWDER, FOR SOLUTION INTRAVENOUS at 03:41

## 2019-12-12 RX ADMIN — HEPARIN SODIUM 5000 UNITS: 5000 INJECTION INTRAVENOUS; SUBCUTANEOUS at 01:59

## 2019-12-12 RX ADMIN — FAMOTIDINE 20 MG: 20 TABLET ORAL at 09:30

## 2019-12-12 NOTE — DISCHARGE INSTRUCTIONS
Patient Education   Call [phone] to schedule Surgery follow-up appointment; will also arrange Gastroenterology evaluation at this appointment. Learning About Gallstones  What are gallstones? Gallstones are stones that form in the gallbladder. The gallbladder is a small sac located just under the liver. It stores bile released by the liver. Bile helps you digest fats. Gallstones can be smaller than a grain of sand or as large as a golf ball. Gallstones form when cholesterol and other things found in bile make stones. They can also form if the gallbladder doesn't empty as it should. Gallstones can also form in the common bile duct or cystic duct. These tubes carry bile from the gallbladder and the liver to the small intestine. What happens when you have gallstones? Gallstones can cause many different problems, such as:  · A blockage in the common bile duct. · Inflammation or infection of the gallbladder (acute cholecystitis). This can happen when a gallstone blocks the cystic duct. · Inflammation or infection of the common bile duct (cholangitis). This can happen when gallstones get stuck in the common bile duct. In rare cases, this can damage the liver or spread infection. · Pancreatitis, an inflammation of the pancreas. What are the symptoms? · Most people who have gallstones don't have symptoms. · When symptoms occur, they can include:  ? Pain in the pit of your stomach or in the upper right part of your belly. It may spread to your right upper back or shoulder blade area. ? Pain that may come and go or be steady. It may get worse when you eat. ? Fever and chills, if a gallstone is blocking a bile duct and causing an infection. ? Yellowing of your skin and the whites of your eyes. How can you prevent gallstones? There is no sure way to prevent gallstones. But you can reduce your risk of forming gallstones that can cause symptoms. · Stay at a healthy weight.  If you need to lose weight, do so slowly and sensibly. · Eat regular, balanced meals. · Be active, and exercise regularly. How are gallstones treated? · If you don't have symptoms, you probably don't need treatment. · For mild symptoms, your doctor may have you take pain medicine and wait to see if the pain goes away. · For severe pain or infection, or if you have more than one gallstone attack, your doctor may suggest surgery to have your gallbladder removed. The body works fine without a gallbladder. Follow-up care is a key part of your treatment and safety. Be sure to make and go to all appointments, and call your doctor if you are having problems. It's also a good idea to know your test results and keep a list of the medicines you take. Where can you learn more? Go to http://tee-umer.info/. Enter  in the search box to learn more about \"Learning About Gallstones. \"  Current as of: November 7, 2018  Content Version: 12.2  © 6045-5013 [employer]. Care instructions adapted under license by [employer] (which disclaims liability or warranty for this information). If you have questions about a medical condition or this instruction, always ask your healthcare professional. [place] [address] any warranty or liability for your use of this information. Discharge Instructions       PATIENT ID: Sabas Plascencia  MRN: [id number]   YOB: 1990    DATE OF ADMISSION: 12/7/2019 10:03 AM    DATE OF DISCHARGE: 12/12/2019    PRIMARY CARE PROVIDER: Sylvester Helton MD     ATTENDING PHYSICIAN: Calderon Garza MD  DISCHARGING PROVIDER: Malcolm Palafox MD    To contact this individual call [phone] and ask the  to page. If unavailable ask to be transferred the Adult Hospitalist Department.     DISCHARGE DIAGNOSES Unspecified Abdominal pain, likely chronic cholecystitis    CONSULTATIONS: IP CONSULT TO GENERAL SURGERY    PROCEDURES/SURGERIES: * No surgery found *    FOLLOW UP APPOINTMENTS:   Follow-up Information     Follow up With Specialties Details [address] Camilo De La Rosa MD [facility]   [address] [facility] [id number] [phone]      Lyn Oswald MD General Surgery, [address] [facility], Breast Surgery In 2 weeks  [address] [facility] [address]rence [address] [facility]      Samantha Brewer MD Gastroenterology In 1 week  [address] [place] [id number]             ADDITIONAL CARE RECOMMENDATIONS: follow up with PCP , GI and Surgery    DIET: Resume previous diet    DISCHARGE MEDICATIONS:  AntiEmetics, Amlodipine    · It is important that you take the medication exactly as they are prescribed. · Keep your medication in the bottles provided by the pharmacist and keep a list of the medication names, dosages, and times to be taken in your wallet. · Do not take other medications without consulting your doctor. NOTIFY YOUR PHYSICIAN FOR ANY OF THE FOLLOWING:   Fever over 101 degrees for 24 hours. Chest pain, shortness of breath, fever, chills, nausea, vomiting, diarrhea, change in mentation, falling, weakness, bleeding. Severe pain or pain not relieved by medications. Or, any other signs or symptoms that you may have questions about. It was our pleasure to help take care of you and we hope you get well very soon.     DISPOSITION:    Home With:   OT  PT  HH  RN       SNF/Inpatient Rehab/LTAC   x Independent/assisted living    Hospice    Other:     CDMP Checked:   Yes x     PROBLEM LIST Updated:  Yes x     Signed:   Saranya Miles MD  12/12/2019  12:31 PM

## 2019-12-12 NOTE — DISCHARGE SUMMARY
Discharge Summary       PATIENT ID: Princess Hendrix  MRN: [id number]   YOB: 1990    DATE OF ADMISSION: 12/7/2019 10:03 AM    DATE OF DISCHARGE: 12/12/2019   PRIMARY CARE PROVIDER: Eliza Swanson MD     ATTENDING PHYSICIAN: Bria Foley MD  DISCHARGING PROVIDER: Bria Foley MD    To contact this individual call [phone] and ask the  to page. If unavailable ask to be transferred the Adult Hospitalist Department. CONSULTATIONS: IP CONSULT TO GENERAL SURGERY    PROCEDURES/SURGERIES: * No surgery found *    ADMITTING DIAGNOSES & HOSPITAL COURSE:   Admitted with abdominal symptoms, diagnostic not revealing of acute findings, surgery evaluating for likely chronic Gall bladder disease, will f/u op and consider [place] if symptoms not improving. Risk of Re-Admission: mod  DISCHARGE DIAGNOSES / PLAN:      #. Suspect chronic cholecystitis  # Sepsis no clear source - resolved  Supported by tachycardia, high white cell count and elevated lactic acid, +UA,  associated nausea and vomiting   - UA with bacteria, urine culture no growth - lactic acidosis resolved   - blood culture gram +ve cocci 1/4 bottle and no growth in others. - likely contamination, repeat blood cultures negative. - CT abdomen no hydronephrosis, no appendicitis   - RUQ abdominal US  Small amount of gallbladder sludge, with wall thickening up to 4.3 mm and mild pericholecystic fluid. 9 mm gallbladder polyp or adherent stone  - short cefepime course finished- supportive measures: on IVF, Zofran and pain medication   - surgery consult- HIDA scan -ve- advance diet to GI lite tolerates well, Surgery will f/u op with plans for possible [place] if not improving.     # Anion gap metabolic acidosis: resolved likely from lactic acidosis    # JM: Resolved    # Sob/wheeze: chest x ray unremarkable. Nebs PRN - resolved. # RUQ/Epigastric pain: trial of Pepcid.  US as above.  Plan as above - f/u with GI  # Hyponatremia: resolved after IVF      FOLLOW UP APPOINTMENTS:    Follow-up Information     Follow up With Specialties Details Why Contact Info    Juan Veloz MD General acute hospital   [address] [facility] [phone]      Elisha Hoffman MD General Surgery, [address] [facility] [address], Breast Surgery In 2 weeks  [address] [facility] Clarence Hansen [address] [facility]      Cosimo Ahumada, MD Gastroenterology In 1 week  [address]  [address]  [phone]           ADDITIONAL CARE RECOMMENDATIONS:  Follow up with PCP, GI and Surgery    DIET: Resume previous diet    ACTIVITY: Activity as tolerated    DISCHARGE MEDICATIONS:  Current Discharge Medication List      START taking these medications    Details   famotidine (PEPCID) 20 mg tablet Take 1 Tab by mouth two (2) times a day for 30 days. Qty: 60 Tab, Refills: 0      amLODIPine (NORVASC) 5 mg tablet Take 1 Tab by mouth daily for 30 days. Qty: 30 Tab, Refills: 0      ondansetron hcl (ZOFRAN) 4 mg tablet Take 1 Tab by mouth every eight (8) hours as needed for Nausea for up to 10 days. Qty: 30 Tab, Refills: 0           NOTIFY YOUR PHYSICIAN FOR ANY OF THE FOLLOWING:   Fever over 101 degrees for 24 hours. Chest pain, shortness of breath, fever, chills, nausea, vomiting, diarrhea, change in mentation, falling, weakness, bleeding. Severe pain or pain not relieved by medications. Or, any other signs or symptoms that you may have questions about.     DISPOSITION:    Home With:   OT  PT  HH  RN       Long term SNF/Inpatient Rehab   x Independent/assisted living    Hospice    Other:     PATIENT CONDITION AT DISCHARGE:     Functional status    Poor     Deconditioned     Independent      Cognition     Lucid     Forgetful     Dementia      Catheters/lines (plus indication)    [place]     PICC     PEG     None      Code status     Full code     DNR      PHYSICAL EXAMINATION AT DISCHARGE:  Patient seen and examined at bedside, Condition stable, explained discharge and follow up plans. BP (!) 168/98 (BP 1 Location: Right arm, BP Patient Position: At rest)   Pulse 82   Temp 97.9 °F (36.6 °C)   Resp 19   Wt 59 kg (130 lb)   SpO2 100%   BMI 19.77 kg/m²   General:  Alert, oriented, No acute distress  Resp:  No accessory muscle use, Good AE. Neuro:  Grossly normal, no focal neuro deficits, follows commands   CHRONIC MEDICAL DIAGNOSES:  Problem List as of 12/12/2019 Date Reviewed: 1/31/2014          Codes Class Noted - Resolved    Sepsis ([facility] [address] ICD-10-CM: A41.9  ICD-9-CM: 038.9, 995.91  12/7/2019 - Present        Abdominal pain ICD-10-CM: R10.9  ICD-9-CM: 789.00  3/30/2012 - Present        Diarrhea ICD-10-CM: R19.7  ICD-9-CM: 787.91  3/30/2012 - Present        Nausea ICD-10-CM: R11.0  ICD-9-CM: 787.02  3/30/2012 - Present              38 minutes were spent with the patient on counseling and coordination of care.     Signed:   Vladislav Wright MD  12/12/2019  12:32 PM

## 2019-12-12 NOTE — PROGRESS NOTES
Tolerating GI Lite diet. OK for discharge from Surgery standpoint. He will follow-up with me in office in 2 weeks; given chronic \"stomach\" problems will also arrange outpatient Gastroenterology evaluation for endoscopy prior to consideration of cholecystectomy.

## 2019-12-13 LAB
BACTERIA SPEC CULT: ABNORMAL
SERVICE CMNT-IMP: ABNORMAL

## 2019-12-14 LAB
BACTERIA SPEC CULT: NORMAL
SERVICE CMNT-IMP: NORMAL

## 2022-10-19 NOTE — PROGRESS NOTES
Patient's blood pressure was elevated 154/94. Sent message on [employer] to Shy Ricks NP. No new orders. Just to continue to monitor until systolic is greater than 610. I asked the patient was he in any pain, he states \"no I feel good thanks for asking\". Patient doesn't appear in any distress; laying in bed on his phone. Will continue to monitor. Rhofade Counseling: Rhofade is a topical medication which can decrease superficial blood flow where applied. Side effects are uncommon and include stinging, redness and allergic reactions.